# Patient Record
Sex: FEMALE | Race: BLACK OR AFRICAN AMERICAN | Employment: FULL TIME | URBAN - METROPOLITAN AREA
[De-identification: names, ages, dates, MRNs, and addresses within clinical notes are randomized per-mention and may not be internally consistent; named-entity substitution may affect disease eponyms.]

---

## 2018-08-16 ENCOUNTER — HOSPITAL ENCOUNTER (OUTPATIENT)
Dept: PREADMISSION TESTING | Age: 63
Discharge: HOME | End: 2018-08-16
Payer: COMMERCIAL

## 2018-08-16 VITALS
HEIGHT: 63 IN | TEMPERATURE: 97.8 F | WEIGHT: 202 LBS | SYSTOLIC BLOOD PRESSURE: 144 MMHG | DIASTOLIC BLOOD PRESSURE: 78 MMHG | BODY MASS INDEX: 35.79 KG/M2 | HEART RATE: 71 BPM

## 2018-08-16 LAB
ABO + RH BLD: NORMAL
ANION GAP SERPL CALC-SCNC: 8 MMOL/L (ref 5–15)
APPEARANCE UR: CLEAR
BACTERIA URNS QL MICRO: NEGATIVE /HPF
BILIRUB UR QL: NEGATIVE
BLOOD GROUP ANTIBODIES SERPL: NORMAL
BUN SERPL-MCNC: 18 MG/DL (ref 6–20)
BUN/CREAT SERPL: 19 (ref 12–20)
CALCIUM SERPL-MCNC: 9.3 MG/DL (ref 8.5–10.1)
CHLORIDE SERPL-SCNC: 101 MMOL/L (ref 97–108)
CO2 SERPL-SCNC: 32 MMOL/L (ref 21–32)
COLOR UR: NORMAL
CREAT SERPL-MCNC: 0.93 MG/DL (ref 0.55–1.02)
EPITH CASTS URNS QL MICRO: NORMAL /LPF
ERYTHROCYTE [DISTWIDTH] IN BLOOD BY AUTOMATED COUNT: 14.1 % (ref 11.5–14.5)
EST. AVERAGE GLUCOSE BLD GHB EST-MCNC: 117 MG/DL
GLUCOSE SERPL-MCNC: 134 MG/DL (ref 65–100)
GLUCOSE UR STRIP.AUTO-MCNC: NEGATIVE MG/DL
HBA1C MFR BLD: 5.7 % (ref 4.2–6.3)
HCT VFR BLD AUTO: 41.8 % (ref 35–47)
HGB BLD-MCNC: 13.3 G/DL (ref 11.5–16)
HGB UR QL STRIP: NEGATIVE
HYALINE CASTS URNS QL MICRO: NORMAL /LPF (ref 0–5)
INR PPP: 1 (ref 0.9–1.1)
KETONES UR QL STRIP.AUTO: NEGATIVE MG/DL
LEUKOCYTE ESTERASE UR QL STRIP.AUTO: NEGATIVE
MCH RBC QN AUTO: 25.6 PG (ref 26–34)
MCHC RBC AUTO-ENTMCNC: 31.8 G/DL (ref 30–36.5)
MCV RBC AUTO: 80.4 FL (ref 80–99)
NITRITE UR QL STRIP.AUTO: NEGATIVE
NRBC # BLD: 0 K/UL (ref 0–0.01)
NRBC BLD-RTO: 0 PER 100 WBC
PH UR STRIP: 7.5 [PH] (ref 5–8)
PLATELET # BLD AUTO: 242 K/UL (ref 150–400)
PMV BLD AUTO: 12.5 FL (ref 8.9–12.9)
POTASSIUM SERPL-SCNC: 3.2 MMOL/L (ref 3.5–5.1)
PROT UR STRIP-MCNC: NEGATIVE MG/DL
PROTHROMBIN TIME: 10.3 SEC (ref 9–11.1)
RBC # BLD AUTO: 5.2 M/UL (ref 3.8–5.2)
RBC #/AREA URNS HPF: NORMAL /HPF (ref 0–5)
SODIUM SERPL-SCNC: 141 MMOL/L (ref 136–145)
SP GR UR REFRACTOMETRY: 1.02 (ref 1–1.03)
SPECIMEN EXP DATE BLD: NORMAL
UA: UC IF INDICATED,UAUC: NORMAL
UROBILINOGEN UR QL STRIP.AUTO: 0.2 EU/DL (ref 0.2–1)
WBC # BLD AUTO: 8.1 K/UL (ref 3.6–11)
WBC URNS QL MICRO: NORMAL /HPF (ref 0–4)

## 2018-08-16 PROCEDURE — 93005 ELECTROCARDIOGRAM TRACING: CPT

## 2018-08-16 PROCEDURE — 80048 BASIC METABOLIC PNL TOTAL CA: CPT | Performed by: ORTHOPAEDIC SURGERY

## 2018-08-16 PROCEDURE — 86900 BLOOD TYPING SEROLOGIC ABO: CPT | Performed by: ORTHOPAEDIC SURGERY

## 2018-08-16 PROCEDURE — 85610 PROTHROMBIN TIME: CPT | Performed by: ORTHOPAEDIC SURGERY

## 2018-08-16 PROCEDURE — 83036 HEMOGLOBIN GLYCOSYLATED A1C: CPT | Performed by: ORTHOPAEDIC SURGERY

## 2018-08-16 PROCEDURE — 81001 URINALYSIS AUTO W/SCOPE: CPT | Performed by: ORTHOPAEDIC SURGERY

## 2018-08-16 PROCEDURE — 85027 COMPLETE CBC AUTOMATED: CPT | Performed by: ORTHOPAEDIC SURGERY

## 2018-08-16 RX ORDER — CHOLECALCIFEROL (VITAMIN D3) 125 MCG
220 CAPSULE ORAL DAILY
COMMUNITY
End: 2018-08-25

## 2018-08-16 RX ORDER — TRIAMTERENE/HYDROCHLOROTHIAZID 37.5-25 MG
1 TABLET ORAL DAILY
COMMUNITY

## 2018-08-16 RX ORDER — LOSARTAN POTASSIUM 50 MG/1
50 TABLET ORAL DAILY
COMMUNITY

## 2018-08-16 RX ORDER — DICLOFENAC SODIUM 50 MG/1
50 TABLET, DELAYED RELEASE ORAL 2 TIMES DAILY
COMMUNITY
End: 2018-08-25

## 2018-08-16 RX ORDER — GUAIFENESIN 100 MG/5ML
81 LIQUID (ML) ORAL DAILY
COMMUNITY
End: 2018-08-25

## 2018-08-17 PROBLEM — M16.11 PRIMARY OSTEOARTHRITIS OF RIGHT HIP: Status: ACTIVE | Noted: 2018-08-17

## 2018-08-17 LAB
ATRIAL RATE: 70 BPM
BACTERIA SPEC CULT: NORMAL
BACTERIA SPEC CULT: NORMAL
CALCULATED P AXIS, ECG09: 36 DEGREES
CALCULATED R AXIS, ECG10: 1 DEGREES
CALCULATED T AXIS, ECG11: 31 DEGREES
DIAGNOSIS, 93000: NORMAL
P-R INTERVAL, ECG05: 148 MS
Q-T INTERVAL, ECG07: 404 MS
QRS DURATION, ECG06: 80 MS
QTC CALCULATION (BEZET), ECG08: 436 MS
SERVICE CMNT-IMP: NORMAL
VENTRICULAR RATE, ECG03: 70 BPM

## 2018-08-17 NOTE — PERIOP NOTES
FAXED PRE-ADMISSION TESTING REPORTS (AND FAX CONFIRMATION RECEIVED TO DR. FARNSWORTH'S OFFICE. CALLED ON 8/17/18  AT 0955  AND LEFT MESSAGE ON JAN'S  VOICEMAIL, RE: ABNORMAL POTASSIUM 3.  2(L)

## 2018-08-17 NOTE — H&P
Chief Complaint: Pain of the Right Hip     Bozena Torres comes in to the clinic today for follow-up of her right hip. We've seen the patient 2 weeks ago for her moderate arthritis to her knees. At that time, I believed her greater concerns was the right hip as she was found to have advanced degenerative arthritis with complete loss of joint space. At that time, we discussed right total hip replacement and she wished to defer this surgery for a later date. She comes in to the clinic today now to discuss about the surgery. She continues to have severe right hip pain. She is unable to due her daily activities without difficulty. Her pain causes her to wake up at night. The patient feels she failed conservative treatment. No recent injuries or aggravating events.      Of note, patient notes the sensation of right leg shortening for the past several years now.      Review of Systems   7/30/2018             Musculoskeletal: Joint Pain: Positive     Medical History        Past Medical History:   Diagnosis Date    Hypertension              Surgical History   Past Surgical History:   Procedure Laterality Date    NO RELEVANT ORTHOPAEDIC SURGERIES        NO RELEVANT SURGERIES                Objective:          Vitals:     07/30/18 0957   BP: 118/68         Constitutional:  No acute distress. Well nourished. Well developed. Eyes:  Sclera are nonicteric. Respiratory:  No labored breathing. Cardiovascular:  No marked edema. Skin:  No marked skin ulcers. Neurological:  No marked sensory loss noted. Psychiatric: Alert and oriented x3. Musculoskeletal      On exam reveals the patient ambulates with a marked limp favoring the right hip. Marked reduction to rotation with any attempts to rotate are painful. Right leg is approximately 3 mm shorter than the left. No calf pain or DVT. No numbness, tingling or paresthesias.  No signs of infection.      Imaging/Studies:         X-ray Hip Right 2+ Views ([id number])     Result Date: 7/17/2018  AP Pelvis, Frog Lateral.      Notes  Impression: I ordered and interpreted X-rays of the right hip. They   revealed marked degenerative changes in the right hip. Essentially   complete loss of joint space. No fracture. No metastatic disease.        Assessment:   There is no problem list on file for this patient.        1. Primary osteoarthritis of right hip             Plan:   I reviewed my findings with the patient. I reviewed the pathophysiology and explained she has advanced degenerative arthritis to the right hip. We discussed treatment options. The patient has failed conservative treatment. I explained that the only treatment modality that would provide her long term relief of her symptoms would be right total hip replacement. The patient wished to proceed with the proposed plan.      I reviewed the hospitalization, post-op and rehabilitation for total hip replacement. We discussed all complications associated with the surgery, including the chance of infection and DVT. She understands that she would be on antibiotics and Xarelto following surgery to prevent infection and DVT. We discussed the small potential need for blood transfusion. I discussed issues of leg length and instability. I discussed the chance of dislocation following total hip replacement and instructed her on total hip precautions. PROCEDURE: Right total hip replacement. Date of Surgery Update:  Cheryl Pace was seen and examined. Past Medical History:   Diagnosis Date    Anxiety     Arthritis     Hypertension     BORDERLINE     Prior to Admission Medications   Prescriptions Last Dose Informant Patient Reported? Taking?   aspirin 81 mg chewable tablet 8/13/2018  Yes No   Sig: Take 81 mg by mouth daily. diclofenac EC (VOLTAREN) 50 mg EC tablet 8/16/2018 at Unknown time  Yes Yes   Sig: Take 50 mg by mouth two (2) times a day. losartan (COZAAR) 50 mg tablet 8/22/2018 at 8a  Yes Yes   Sig: Take 50 mg by mouth daily. naproxen sodium (ALEVE) 220 mg cap 8/16/2018 at Unknown time  Yes Yes   Sig: Take 220 mg by mouth daily. triamterene-hydroCHLOROthiazide (MAXZIDE) 37.5-25 mg per tablet 8/22/2018 at 8a  Yes Yes   Sig: Take 1 Tab by mouth daily. Facility-Administered Medications: None      Allergy to: Bactrim [sulfamethoprim] and Oxycodone  Physical Examination: General appearance - alert, well appearing, and in no distress  History and physical has been reviewed. The patient has been examined.  There have been no significant clinical changes since the completion of the originally dated History and Physical.    Signed By: Bolivar Pereira PA-C     August 23, 2018 7:33 AM

## 2018-08-22 ENCOUNTER — ANESTHESIA EVENT (OUTPATIENT)
Dept: SURGERY | Age: 63
DRG: 470 | End: 2018-08-22
Payer: COMMERCIAL

## 2018-08-23 ENCOUNTER — ANESTHESIA (OUTPATIENT)
Dept: SURGERY | Age: 63
DRG: 470 | End: 2018-08-23
Payer: COMMERCIAL

## 2018-08-23 ENCOUNTER — APPOINTMENT (OUTPATIENT)
Dept: GENERAL RADIOLOGY | Age: 63
DRG: 470 | End: 2018-08-23
Payer: COMMERCIAL

## 2018-08-23 ENCOUNTER — APPOINTMENT (OUTPATIENT)
Dept: GENERAL RADIOLOGY | Age: 63
DRG: 470 | End: 2018-08-23
Attending: ORTHOPAEDIC SURGERY
Payer: COMMERCIAL

## 2018-08-23 ENCOUNTER — HOSPITAL ENCOUNTER (INPATIENT)
Age: 63
LOS: 2 days | Discharge: HOME HEALTH | DRG: 470 | End: 2018-08-25
Attending: ORTHOPAEDIC SURGERY | Admitting: ORTHOPAEDIC SURGERY
Payer: COMMERCIAL

## 2018-08-23 DIAGNOSIS — M16.11 PRIMARY OSTEOARTHRITIS OF RIGHT HIP: Primary | ICD-10-CM

## 2018-08-23 LAB
GLUCOSE BLD STRIP.AUTO-MCNC: 107 MG/DL (ref 65–100)
SERVICE CMNT-IMP: ABNORMAL

## 2018-08-23 PROCEDURE — 74011250637 HC RX REV CODE- 250/637: Performed by: PHYSICIAN ASSISTANT

## 2018-08-23 PROCEDURE — 77030034850: Performed by: ORTHOPAEDIC SURGERY

## 2018-08-23 PROCEDURE — 77030008467 HC STPLR SKN COVD -B: Performed by: ORTHOPAEDIC SURGERY

## 2018-08-23 PROCEDURE — 77030006822 HC BLD SAW SAG BRSM -B: Performed by: ORTHOPAEDIC SURGERY

## 2018-08-23 PROCEDURE — 77030018846 HC SOL IRR STRL H20 ICUM -A: Performed by: ORTHOPAEDIC SURGERY

## 2018-08-23 PROCEDURE — 76210000006 HC OR PH I REC 0.5 TO 1 HR: Performed by: ORTHOPAEDIC SURGERY

## 2018-08-23 PROCEDURE — 77030032490 HC SLV COMPR SCD KNE COVD -B: Performed by: ORTHOPAEDIC SURGERY

## 2018-08-23 PROCEDURE — 77030020788: Performed by: ORTHOPAEDIC SURGERY

## 2018-08-23 PROCEDURE — 76060000036 HC ANESTHESIA 2.5 TO 3 HR: Performed by: ORTHOPAEDIC SURGERY

## 2018-08-23 PROCEDURE — 77030013079 HC BLNKT BAIR HGGR 3M -A: Performed by: ANESTHESIOLOGY

## 2018-08-23 PROCEDURE — 74011000258 HC RX REV CODE- 258

## 2018-08-23 PROCEDURE — 77030035236 HC SUT PDS STRATFX BARB J&J -B: Performed by: ORTHOPAEDIC SURGERY

## 2018-08-23 PROCEDURE — C1776 JOINT DEVICE (IMPLANTABLE): HCPCS | Performed by: ORTHOPAEDIC SURGERY

## 2018-08-23 PROCEDURE — 74011250636 HC RX REV CODE- 250/636

## 2018-08-23 PROCEDURE — 65270000029 HC RM PRIVATE

## 2018-08-23 PROCEDURE — 74011000250 HC RX REV CODE- 250: Performed by: PHYSICIAN ASSISTANT

## 2018-08-23 PROCEDURE — 77030038020 HC MANFLD NEPTUNE STRY -B: Performed by: ORTHOPAEDIC SURGERY

## 2018-08-23 PROCEDURE — 82962 GLUCOSE BLOOD TEST: CPT

## 2018-08-23 PROCEDURE — 77030018836 HC SOL IRR NACL ICUM -A: Performed by: ORTHOPAEDIC SURGERY

## 2018-08-23 PROCEDURE — 77030007866 HC KT SPN ANES BBMI -B: Performed by: ANESTHESIOLOGY

## 2018-08-23 PROCEDURE — 73501 X-RAY EXAM HIP UNI 1 VIEW: CPT

## 2018-08-23 PROCEDURE — 77030020782 HC GWN BAIR PAWS FLX 3M -B

## 2018-08-23 PROCEDURE — 77030012935 HC DRSG AQUACEL BMS -B: Performed by: ORTHOPAEDIC SURGERY

## 2018-08-23 PROCEDURE — 76010000172 HC OR TIME 2.5 TO 3 HR INTENSV-TIER 1: Performed by: ORTHOPAEDIC SURGERY

## 2018-08-23 PROCEDURE — 74011250636 HC RX REV CODE- 250/636: Performed by: PHYSICIAN ASSISTANT

## 2018-08-23 PROCEDURE — 0SR903A REPLACEMENT OF RIGHT HIP JOINT WITH CERAMIC SYNTHETIC SUBSTITUTE, UNCEMENTED, OPEN APPROACH: ICD-10-PCS | Performed by: ORTHOPAEDIC SURGERY

## 2018-08-23 PROCEDURE — 77030031139 HC SUT VCRL2 J&J -A: Performed by: ORTHOPAEDIC SURGERY

## 2018-08-23 PROCEDURE — 77030011640 HC PAD GRND REM COVD -A: Performed by: ORTHOPAEDIC SURGERY

## 2018-08-23 PROCEDURE — 74011000250 HC RX REV CODE- 250

## 2018-08-23 PROCEDURE — 77030011264 HC ELECTRD BLD EXT COVD -A: Performed by: ORTHOPAEDIC SURGERY

## 2018-08-23 DEVICE — PINNACLE HIP SOLUTIONS ALTRX POLYETHYLENE ACETABULAR LINER NEUTRAL 36MM ID 52MM OD
Type: IMPLANTABLE DEVICE | Site: HIP | Status: FUNCTIONAL
Brand: PINNACLE ALTRX

## 2018-08-23 DEVICE — SUMMIT FEMORAL STEM 12/14 TAPER TAPER ED W/POROCOAT SIZE 6 STD 150MM
Type: IMPLANTABLE DEVICE | Site: HIP | Status: FUNCTIONAL
Brand: SUMMIT POROCOAT

## 2018-08-23 DEVICE — PINNACLE CANCELLOUS BONE SCREW 6.5MM X 20MM
Type: IMPLANTABLE DEVICE | Site: HIP | Status: FUNCTIONAL
Brand: PINNACLE

## 2018-08-23 DEVICE — BIOLOX DELTA CERAMIC FEMORAL HEAD +1.5 36MM DIA 12/14 TAPER
Type: IMPLANTABLE DEVICE | Site: HIP | Status: FUNCTIONAL
Brand: BIOLOX DELTA

## 2018-08-23 DEVICE — PINNACLE GRIPTION ACETABULAR SHELL SECTOR 52MM OD
Type: IMPLANTABLE DEVICE | Site: HIP | Status: FUNCTIONAL
Brand: PINNACLE GRIPTION

## 2018-08-23 DEVICE — COMPONENT TOT HIP PRIMARY CERM ALTRX: Type: IMPLANTABLE DEVICE | Status: FUNCTIONAL

## 2018-08-23 RX ORDER — HYDROXYZINE HYDROCHLORIDE 10 MG/1
10 TABLET, FILM COATED ORAL
Status: DISCONTINUED | OUTPATIENT
Start: 2018-08-23 | End: 2018-08-25 | Stop reason: HOSPADM

## 2018-08-23 RX ORDER — AMOXICILLIN 250 MG
1 CAPSULE ORAL 2 TIMES DAILY
Status: DISCONTINUED | OUTPATIENT
Start: 2018-08-23 | End: 2018-08-25 | Stop reason: HOSPADM

## 2018-08-23 RX ORDER — FENTANYL CITRATE 50 UG/ML
25 INJECTION, SOLUTION INTRAMUSCULAR; INTRAVENOUS
Status: DISCONTINUED | OUTPATIENT
Start: 2018-08-23 | End: 2018-08-23 | Stop reason: HOSPADM

## 2018-08-23 RX ORDER — ONDANSETRON 2 MG/ML
4 INJECTION INTRAMUSCULAR; INTRAVENOUS
Status: ACTIVE | OUTPATIENT
Start: 2018-08-23 | End: 2018-08-24

## 2018-08-23 RX ORDER — PROPOFOL 10 MG/ML
INJECTION, EMULSION INTRAVENOUS
Status: DISCONTINUED | OUTPATIENT
Start: 2018-08-23 | End: 2018-08-23 | Stop reason: HOSPADM

## 2018-08-23 RX ORDER — PROPOFOL 10 MG/ML
INJECTION, EMULSION INTRAVENOUS AS NEEDED
Status: DISCONTINUED | OUTPATIENT
Start: 2018-08-23 | End: 2018-08-23 | Stop reason: HOSPADM

## 2018-08-23 RX ORDER — ROPIVACAINE HYDROCHLORIDE 5 MG/ML
150 INJECTION, SOLUTION EPIDURAL; INFILTRATION; PERINEURAL AS NEEDED
Status: DISCONTINUED | OUTPATIENT
Start: 2018-08-23 | End: 2018-08-23 | Stop reason: HOSPADM

## 2018-08-23 RX ORDER — PHENYLEPHRINE HCL IN 0.9% NACL 0.4MG/10ML
SYRINGE (ML) INTRAVENOUS AS NEEDED
Status: DISCONTINUED | OUTPATIENT
Start: 2018-08-23 | End: 2018-08-23 | Stop reason: HOSPADM

## 2018-08-23 RX ORDER — SODIUM CHLORIDE, SODIUM LACTATE, POTASSIUM CHLORIDE, CALCIUM CHLORIDE 600; 310; 30; 20 MG/100ML; MG/100ML; MG/100ML; MG/100ML
100 INJECTION, SOLUTION INTRAVENOUS CONTINUOUS
Status: DISCONTINUED | OUTPATIENT
Start: 2018-08-23 | End: 2018-08-23 | Stop reason: HOSPADM

## 2018-08-23 RX ORDER — FENTANYL CITRATE 50 UG/ML
50 INJECTION, SOLUTION INTRAMUSCULAR; INTRAVENOUS AS NEEDED
Status: DISCONTINUED | OUTPATIENT
Start: 2018-08-23 | End: 2018-08-23 | Stop reason: HOSPADM

## 2018-08-23 RX ORDER — SODIUM CHLORIDE, SODIUM LACTATE, POTASSIUM CHLORIDE, CALCIUM CHLORIDE 600; 310; 30; 20 MG/100ML; MG/100ML; MG/100ML; MG/100ML
INJECTION, SOLUTION INTRAVENOUS
Status: DISCONTINUED | OUTPATIENT
Start: 2018-08-23 | End: 2018-08-23 | Stop reason: HOSPADM

## 2018-08-23 RX ORDER — POLYETHYLENE GLYCOL 3350 17 G/17G
17 POWDER, FOR SOLUTION ORAL DAILY
Status: DISCONTINUED | OUTPATIENT
Start: 2018-08-23 | End: 2018-08-25 | Stop reason: HOSPADM

## 2018-08-23 RX ORDER — SODIUM CHLORIDE 0.9 % (FLUSH) 0.9 %
5-10 SYRINGE (ML) INJECTION EVERY 8 HOURS
Status: DISCONTINUED | OUTPATIENT
Start: 2018-08-23 | End: 2018-08-23 | Stop reason: HOSPADM

## 2018-08-23 RX ORDER — NALOXONE HYDROCHLORIDE 0.4 MG/ML
0.4 INJECTION, SOLUTION INTRAMUSCULAR; INTRAVENOUS; SUBCUTANEOUS AS NEEDED
Status: DISCONTINUED | OUTPATIENT
Start: 2018-08-23 | End: 2018-08-25 | Stop reason: HOSPADM

## 2018-08-23 RX ORDER — SODIUM CHLORIDE 9 MG/ML
125 INJECTION, SOLUTION INTRAVENOUS CONTINUOUS
Status: DISPENSED | OUTPATIENT
Start: 2018-08-23 | End: 2018-08-24

## 2018-08-23 RX ORDER — MIDAZOLAM HYDROCHLORIDE 1 MG/ML
1 INJECTION, SOLUTION INTRAMUSCULAR; INTRAVENOUS AS NEEDED
Status: DISCONTINUED | OUTPATIENT
Start: 2018-08-23 | End: 2018-08-23 | Stop reason: HOSPADM

## 2018-08-23 RX ORDER — ACETAMINOPHEN 325 MG/1
650 TABLET ORAL
Status: DISCONTINUED | OUTPATIENT
Start: 2018-08-23 | End: 2018-08-25 | Stop reason: HOSPADM

## 2018-08-23 RX ORDER — MIDAZOLAM HYDROCHLORIDE 1 MG/ML
INJECTION, SOLUTION INTRAMUSCULAR; INTRAVENOUS AS NEEDED
Status: DISCONTINUED | OUTPATIENT
Start: 2018-08-23 | End: 2018-08-23 | Stop reason: HOSPADM

## 2018-08-23 RX ORDER — OXYCODONE HYDROCHLORIDE 5 MG/1
10 TABLET ORAL
Status: DISCONTINUED | OUTPATIENT
Start: 2018-08-23 | End: 2018-08-24

## 2018-08-23 RX ORDER — MIDAZOLAM HYDROCHLORIDE 1 MG/ML
0.5 INJECTION, SOLUTION INTRAMUSCULAR; INTRAVENOUS
Status: DISCONTINUED | OUTPATIENT
Start: 2018-08-23 | End: 2018-08-23 | Stop reason: HOSPADM

## 2018-08-23 RX ORDER — CELECOXIB 200 MG/1
200 CAPSULE ORAL ONCE
Status: COMPLETED | OUTPATIENT
Start: 2018-08-23 | End: 2018-08-23

## 2018-08-23 RX ORDER — SODIUM CHLORIDE 0.9 % (FLUSH) 0.9 %
5-10 SYRINGE (ML) INJECTION EVERY 8 HOURS
Status: DISCONTINUED | OUTPATIENT
Start: 2018-08-24 | End: 2018-08-25 | Stop reason: HOSPADM

## 2018-08-23 RX ORDER — FAMOTIDINE 20 MG/1
20 TABLET, FILM COATED ORAL
Status: DISCONTINUED | OUTPATIENT
Start: 2018-08-23 | End: 2018-08-24

## 2018-08-23 RX ORDER — TRIAMTERENE/HYDROCHLOROTHIAZID 37.5-25 MG
1 TABLET ORAL DAILY
Status: DISCONTINUED | OUTPATIENT
Start: 2018-08-24 | End: 2018-08-24

## 2018-08-23 RX ORDER — LIDOCAINE HYDROCHLORIDE 10 MG/ML
0.1 INJECTION, SOLUTION EPIDURAL; INFILTRATION; INTRACAUDAL; PERINEURAL AS NEEDED
Status: DISCONTINUED | OUTPATIENT
Start: 2018-08-23 | End: 2018-08-23 | Stop reason: HOSPADM

## 2018-08-23 RX ORDER — FENTANYL CITRATE 50 UG/ML
25 INJECTION, SOLUTION INTRAMUSCULAR; INTRAVENOUS
Status: DISCONTINUED | OUTPATIENT
Start: 2018-08-23 | End: 2018-08-24

## 2018-08-23 RX ORDER — BUPIVACAINE HYDROCHLORIDE 5 MG/ML
INJECTION, SOLUTION EPIDURAL; INTRACAUDAL AS NEEDED
Status: DISCONTINUED | OUTPATIENT
Start: 2018-08-23 | End: 2018-08-23 | Stop reason: HOSPADM

## 2018-08-23 RX ORDER — FACIAL-BODY WIPES
10 EACH TOPICAL DAILY PRN
Status: DISCONTINUED | OUTPATIENT
Start: 2018-08-25 | End: 2018-08-25 | Stop reason: HOSPADM

## 2018-08-23 RX ORDER — FENTANYL CITRATE 50 UG/ML
INJECTION, SOLUTION INTRAMUSCULAR; INTRAVENOUS AS NEEDED
Status: DISCONTINUED | OUTPATIENT
Start: 2018-08-23 | End: 2018-08-23 | Stop reason: HOSPADM

## 2018-08-23 RX ORDER — ONDANSETRON 2 MG/ML
INJECTION INTRAMUSCULAR; INTRAVENOUS AS NEEDED
Status: DISCONTINUED | OUTPATIENT
Start: 2018-08-23 | End: 2018-08-23 | Stop reason: HOSPADM

## 2018-08-23 RX ORDER — CELECOXIB 200 MG/1
200 CAPSULE ORAL EVERY 12 HOURS
Status: DISCONTINUED | OUTPATIENT
Start: 2018-08-23 | End: 2018-08-24

## 2018-08-23 RX ORDER — ACETAMINOPHEN 325 MG/1
650 TABLET ORAL EVERY 6 HOURS
Status: DISCONTINUED | OUTPATIENT
Start: 2018-08-23 | End: 2018-08-25 | Stop reason: HOSPADM

## 2018-08-23 RX ORDER — SODIUM CHLORIDE 0.9 % (FLUSH) 0.9 %
5-10 SYRINGE (ML) INJECTION AS NEEDED
Status: DISCONTINUED | OUTPATIENT
Start: 2018-08-23 | End: 2018-08-25 | Stop reason: HOSPADM

## 2018-08-23 RX ORDER — SODIUM CHLORIDE 0.9 % (FLUSH) 0.9 %
5-10 SYRINGE (ML) INJECTION AS NEEDED
Status: DISCONTINUED | OUTPATIENT
Start: 2018-08-23 | End: 2018-08-23 | Stop reason: HOSPADM

## 2018-08-23 RX ORDER — OXYCODONE HYDROCHLORIDE 5 MG/1
5 TABLET ORAL
Status: DISCONTINUED | OUTPATIENT
Start: 2018-08-23 | End: 2018-08-24

## 2018-08-23 RX ORDER — CEFAZOLIN SODIUM/WATER 2 G/20 ML
2 SYRINGE (ML) INTRAVENOUS EVERY 8 HOURS
Status: DISCONTINUED | OUTPATIENT
Start: 2018-08-23 | End: 2018-08-23 | Stop reason: HOSPADM

## 2018-08-23 RX ORDER — LOSARTAN POTASSIUM 50 MG/1
50 TABLET ORAL DAILY
Status: DISCONTINUED | OUTPATIENT
Start: 2018-08-24 | End: 2018-08-24

## 2018-08-23 RX ORDER — CEFAZOLIN SODIUM/WATER 2 G/20 ML
2 SYRINGE (ML) INTRAVENOUS EVERY 8 HOURS
Status: COMPLETED | OUTPATIENT
Start: 2018-08-23 | End: 2018-08-23

## 2018-08-23 RX ORDER — DIPHENHYDRAMINE HYDROCHLORIDE 50 MG/ML
12.5 INJECTION, SOLUTION INTRAMUSCULAR; INTRAVENOUS AS NEEDED
Status: DISCONTINUED | OUTPATIENT
Start: 2018-08-23 | End: 2018-08-23 | Stop reason: HOSPADM

## 2018-08-23 RX ADMIN — FENTANYL CITRATE 25 MCG: 50 INJECTION, SOLUTION INTRAMUSCULAR; INTRAVENOUS at 07:33

## 2018-08-23 RX ADMIN — ACETAMINOPHEN 650 MG: 325 TABLET ORAL at 18:33

## 2018-08-23 RX ADMIN — Medication 80 MCG: at 07:45

## 2018-08-23 RX ADMIN — SODIUM CHLORIDE, SODIUM LACTATE, POTASSIUM CHLORIDE, CALCIUM CHLORIDE: 600; 310; 30; 20 INJECTION, SOLUTION INTRAVENOUS at 07:58

## 2018-08-23 RX ADMIN — CELECOXIB 200 MG: 200 CAPSULE ORAL at 06:27

## 2018-08-23 RX ADMIN — Medication 2 G: at 07:43

## 2018-08-23 RX ADMIN — ACETAMINOPHEN 650 MG: 325 TABLET ORAL at 12:46

## 2018-08-23 RX ADMIN — PROPOFOL 50 MCG/KG/MIN: 10 INJECTION, EMULSION INTRAVENOUS at 07:42

## 2018-08-23 RX ADMIN — BUPIVACAINE HYDROCHLORIDE 11 MG: 5 INJECTION, SOLUTION EPIDURAL; INTRACAUDAL at 07:40

## 2018-08-23 RX ADMIN — SODIUM CHLORIDE, SODIUM LACTATE, POTASSIUM CHLORIDE, CALCIUM CHLORIDE: 600; 310; 30; 20 INJECTION, SOLUTION INTRAVENOUS at 08:40

## 2018-08-23 RX ADMIN — ACETAMINOPHEN 650 MG: 325 TABLET ORAL at 23:48

## 2018-08-23 RX ADMIN — RIVAROXABAN 10 MG: 10 TABLET, FILM COATED ORAL at 19:19

## 2018-08-23 RX ADMIN — DOCUSATE SODIUM AND SENNOSIDES 1 TABLET: 8.6; 5 TABLET, FILM COATED ORAL at 12:46

## 2018-08-23 RX ADMIN — Medication 80 MCG: at 07:42

## 2018-08-23 RX ADMIN — Medication 2 G: at 23:48

## 2018-08-23 RX ADMIN — SODIUM CHLORIDE, SODIUM LACTATE, POTASSIUM CHLORIDE, CALCIUM CHLORIDE: 600; 310; 30; 20 INJECTION, SOLUTION INTRAVENOUS at 07:24

## 2018-08-23 RX ADMIN — PROPOFOL 20 MG: 10 INJECTION, EMULSION INTRAVENOUS at 07:33

## 2018-08-23 RX ADMIN — DOCUSATE SODIUM AND SENNOSIDES 1 TABLET: 8.6; 5 TABLET, FILM COATED ORAL at 18:30

## 2018-08-23 RX ADMIN — Medication 2 G: at 17:06

## 2018-08-23 RX ADMIN — CELECOXIB 200 MG: 200 CAPSULE ORAL at 18:30

## 2018-08-23 RX ADMIN — ONDANSETRON 4 MG: 2 INJECTION INTRAMUSCULAR; INTRAVENOUS at 10:16

## 2018-08-23 RX ADMIN — OXYCODONE HYDROCHLORIDE 5 MG: 5 TABLET ORAL at 13:07

## 2018-08-23 RX ADMIN — MIDAZOLAM HYDROCHLORIDE 2 MG: 1 INJECTION, SOLUTION INTRAMUSCULAR; INTRAVENOUS at 07:33

## 2018-08-23 RX ADMIN — PROPOFOL 20 MG: 10 INJECTION, EMULSION INTRAVENOUS at 07:35

## 2018-08-23 RX ADMIN — OXYCODONE HYDROCHLORIDE 5 MG: 5 TABLET ORAL at 18:30

## 2018-08-23 RX ADMIN — SODIUM CHLORIDE 125 ML/HR: 900 INJECTION, SOLUTION INTRAVENOUS at 10:45

## 2018-08-23 RX ADMIN — POLYETHYLENE GLYCOL 3350 17 G: 17 POWDER, FOR SOLUTION ORAL at 12:46

## 2018-08-23 NOTE — BRIEF OP NOTE
BRIEF OPERATIVE NOTE    Date of Procedure: 8/23/2018   Preoperative Diagnosis: DJD RIGHT HIP   Postoperative Diagnosis: DJD RIGHT HIP     Procedure(s):  RIGHT TOTAL HIP REPLACEMENT   Surgeon(s) and Role:     * Shaista Kim MD - Primary         Surgical Assistant: Mira Bhakta PA-C    Surgical Staff:  Circ-1: Stephen Ham RN  Circ-Relief: Isaías Barajas  Circ-Intern: Lara Cosby RN  Physician Assistant: Catie Sorenson PA-C  Radiology Technician: Constanza Arevalo RT, R  Scrub Tech-1: [address] [facility]  Surg Asst-1: Diantha Half  Event Time In   Incision Start 2931   Incision Close 1001     Anesthesia: Spinal   Estimated Blood Loss: 200 mL  Specimens: * No specimens in log *   Findings: DJD Right hip   Complications: None. Implants:   Implant Name Type Inv.  Item Serial No.  Lot No. LRB No. Used Action   CUP ACET SECTOR GRIPTION 52MM -- TI - SNA  CUP ACET SECTOR GRIPTION 52MM -- TI NA [facility] [employer] [id number] Right 1 Implanted   SCR ACET CANC PINN 6.5X20MM SS --  - SNA  SCR ACET CANC PINN 6.5X20MM SS --  NA [facility] [id number] Right 1 Implanted   LINER ACET PINN NEUT [id number] -- ALTRX - SNA  LINER ACET PINN NEUT [id number] -- ALTRX NA [facility] [id number] Right 1 Implanted   STEM FEM 12/14 TAPR 6 -- [place] - SNA  STEM FEM 12/14 TAPR 6 -- [place] NA [facility] [employer] [id number] Right 1 Implanted   HEAD FEM 36MM +1.5MM NK -- [employer] DELTA - SNA   HEAD FEM 36MM +1.5MM NK -- BIOLOX DELTA NA [facility] [employer] [id number] Right 1 Implanted

## 2018-08-23 NOTE — PROGRESS NOTES
Problem: Mobility Impaired (Adult and Pediatric)  Goal: *Acute Goals and Plan of Care (Insert Text)  Physical Therapy Goals  Initiated 8/23/2018    1. Patient will move from supine to sit and sit to supine , scoot up and down and roll side to side in bed with modified independence within 4 days. 2. Patient will perform sit to stand with modified independence within 4 days. 3. Patient will ambulate with modified independence for 150 feet with the least restrictive device within 4 days. 4. Patient will ascend/descend 8 stairs with 2 handrail(s) with modified independence within 4 days. 5. Patient will perform THR home exercise program per protocol with modified independence within 4 days. physical Therapy EVALUATION  Patient: Lon Cespedes (61 y.o. female)  Date: 8/23/2018  Primary Diagnosis: DJD RIGHT HIP   Primary osteoarthritis of right hip  Procedure(s) (LRB):  RIGHT TOTAL HIP REPLACEMENT  (Right) Day of Surgery   Precautions:   WBAT (50%)    ASSESSMENT :  Based on the objective data described below, the patient presents with impaired standing balance without AD and decreased independence with mobility following R THR POD 0. PTA patient was independent with mobility. Patient is overall min A for bed mobility and transfers with RW. Patient ambulated 35 feet with RW and min A/CGA. Cues for sequencing especially around turning. Patient's VSS throughout session with position changes. Patient returned to supine with min A. Patient anticipates discharge Saturday. Recommend HHPT. Patient will benefit from skilled intervention to address the above impairments.   Patients rehabilitation potential is considered to be Good  Factors which may influence rehabilitation potential include:   [x]         None noted  []         Mental ability/status  []         Medical condition  []         Home/family situation and support systems  []         Safety awareness  []         Pain tolerance/management  []         Other: PLAN :  Recommendations and Planned Interventions:  [x]           Bed Mobility Training             [x]    Neuromuscular Re-Education  [x]           Transfer Training                   []    Orthotic/Prosthetic Training  [x]           Gait Training                         []    Modalities  [x]           Therapeutic Exercises           []    Edema Management/Control  [x]           Therapeutic Activities            [x]    Patient and Family Training/Education  []           Other (comment):    Frequency/Duration: Patient will be followed by physical therapy  twice daily to address goals. Discharge Recommendations: Home Health  Further Equipment Recommendations for Discharge: RW and SPC     SUBJECTIVE:   Patient stated I feel good.     OBJECTIVE DATA SUMMARY:   HISTORY:    Past Medical History:   Diagnosis Date    Anxiety     Arthritis     Hypertension     BORDERLINE     Past Surgical History:   Procedure Laterality Date    HX COLONOSCOPY       Prior Level of Function/Home Situation:  independent  Personal factors and/or comorbidities impacting plan of care:     Home Situation  Home Environment: Private residence  # Steps to Enter: 8  Rails to Enter: Yes  Hand Rails : Bilateral  One/Two Story Residence: Two story, live on 1st floor  Living Alone: No  Support Systems: Family member(s)  Patient Expects to be Discharged to[de-identified] Private residence  Current DME Used/Available at Home: [employer] Keb[employer] Walker    EXAMINATION/PRESENTATION/DECISION MAKING:   Critical Behavior:  Neurologic State: Alert           Hearing:   Auditory  Auditory Impairment: None  Skin:    Edema:   Range Of Motion:  AROM: Generally decreased, functional           PROM: Generally decreased, functional           Strength:    Strength: Generally decreased, functional                    Tone & Sensation:                                  Coordination:     Vision:      Functional Mobility:  Bed Mobility:     Supine to Sit: Minimum assistance  Sit to Supine: Minimum assistance     Transfers:  Sit to Stand: Minimum assistance  Stand to Sit: Minimum assistance  Stand Pivot Transfers: Minimum assistance                    Balance:   Sitting: Intact  Standing: Intact; With support  Ambulation/Gait Training:  Distance (ft): 35 Feet (ft)  Assistive Device: Gait belt;Walker, rolling  Ambulation - Level of Assistance: Contact guard assistance        Gait Abnormalities: Antalgic;Decreased step clearance  Right Side Weight Bearing: As tolerated     Base of Support: Widened  Stance: Right decreased  Speed/Amy: Pace decreased (<100 feet/min)  Step Length: Right shortened;Left shortened                     Stairs: Therapeutic Exercises: Ankle pumps, quad sets, heel slides (AAROM)     Functional Measure:  Timed up and go:    Timed Get Up And Go Test: 18     Timed Up and Go and G-code impairment scale:  Percentage of Impairment CH    0%   CI    1-19% CJ    20-39% CK    40-59% CL    60-79% CM    80-99% CN     100%   Timed   Score 0-56 10 11-12 13-14 15-16 17-18 19 20       < than 10 seconds=Normal  Greater then 13.5 seconds (in elderly)=Increased fall risk   Derek Albarado [place] M. Predicting the probability for falls in community dwelling older adults using the Timed Up and Go Test. Phys Ther. 2000;80:896-903. Pain:  Pain Scale 1: Numeric (0 - 10)  Pain Intensity 1: 0              Activity Tolerance:   VSS  Please refer to the flowsheet for vital signs taken during this treatment. After treatment:   []         Patient left in no apparent distress sitting up in chair  [x]         Patient left in no apparent distress in bed  [x]         Call bell left within reach  [x]         Nursing notified  []         Caregiver present  []         Bed alarm activated    COMMUNICATION/EDUCATION:   The patients plan of care was discussed with: Registered Nurse.   [x]         Fall prevention education was provided and the patient/caregiver indicated understanding. [x]         Patient/family have participated as able in goal setting and plan of care. [x]         Patient/family agree to work toward stated goals and plan of care. []         Patient understands intent and goals of therapy, but is neutral about his/her participation. []         Patient is unable to participate in goal setting and plan of care.     Thank you for this referral.  Adilson Mcgowan, PT

## 2018-08-23 NOTE — IP AVS SNAPSHOT
[address] 
[phone] Patient: Eros Epperson MRN: [id number] RXE:3[birth year]04[id number] About your hospitalization You were admitted on:  August 23, 2018 You last received care in the:  [address] [facility] You were discharged on:  August 25, 2018 Why you were hospitalized Your primary diagnosis was:  Primary Osteoarthritis Of Right Hip Follow-up Information Follow up With Details Comments Contact Info Syd Early, [address] [address] 
[phone] Lisbet  For home health physical therapy. [address] 
Thressa Felty [address] 
[phone] Discharge Orders None A check lyudmila indicates which time of day the medication should be taken. My Medications START taking these medications Instructions Each Dose to Equal  
 Morning Noon Evening Bedtime  
 celecoxib 200 mg capsule Commonly known as:  CeleBREX Your last dose was: Your next dose is: Take 1 Cap by mouth daily for 30 days. 200 mg  
    
   
   
   
  
 oxyCODONE IR 5 mg immediate release tablet Commonly known as:  Waldemar Wolf Your last dose was: Your next dose is: Take 1 Tab by mouth every four (4) hours as needed for Pain. Max Daily Amount: 30 mg.  
 5 mg  
    
   
   
   
  
 rivaroxaban 10 mg tablet Commonly known as:  Aby Khan Your last dose was: Your next dose is: Take 1 Tab by mouth daily (with lunch) for 35 days. Indications: hip surgery deep vein thrombosis prevention 10 mg CONTINUE taking these medications Instructions Each Dose to Equal  
 Morning Noon Evening Bedtime  
 losartan 50 mg tablet Commonly known as:  COZAAR Your last dose was: Your next dose is: Take 50 mg by mouth daily.   
 50 mg  
    
   
   
   
  
 triamterene-hydroCHLOROthiazide 37.5-25 mg per tablet Commonly known as:  Jocypapito Davalos Your last dose was: Your next dose is: Take 1 Tab by mouth daily. 1 Tab STOP taking these medications ALEVE 220 mg Cap Generic drug:  naproxen sodium  
   
  
 aspirin 81 mg chewable tablet  
   
  
 diclofenac EC 50 mg EC tablet Commonly known as:  VOLTAREN Where to Get Your Medications Information on where to get these meds will be given to you by the nurse or doctor. ! Ask your nurse or doctor about these medications  
  celecoxib 200 mg capsule  
 oxyCODONE IR 5 mg immediate release tablet  
 rivaroxaban 10 mg tablet Opioid Education Prescription Opioids: What You Need to Know: 
 
Prescription opioids can be used to help relieve moderate-to-severe pain and are often prescribed following a surgery or injury, or for certain health conditions. These medications can be an important part of treatment but also come with serious risks. Opioids are strong pain medicines. Examples include hydrocodone, oxycodone, fentanyl, and morphine. Heroin is an example of an illegal opioid. It is important to work with your health care provider to make sure you are getting the safest, most effective care. WHAT ARE THE RISKS AND SIDE EFFECTS OF OPIOID USE? Prescription opioids carry serious risks of addiction and overdose, especially with prolonged use. An opioid overdose, often marked by slow breathing, can cause sudden death. The use of prescription opioids can have a number of side effects as well, even when taken as directed. · Tolerance-meaning you might need to take more of a medication for the same pain relief · Physical dependence-meaning you have symptoms of withdrawal when the medication is stopped. Withdrawal symptoms can include nausea, sweating, chills, diarrhea, stomach cramps, and muscle aches.   Withdrawal can last up to several weeks, depending on which drug you took and how long you took it. · Increased sensitivity to pain · Constipation · Nausea, vomiting, and dry mouth · Sleepiness and dizziness · Confusion · Depression · Low levels of testosterone that can result in lower sex drive, energy, and strength · Itching and sweating RISKS ARE GREATER WITH:      
· History of drug misuse, substance use disorder, or overdose · Mental health conditions (such as depression or anxiety) · Sleep apnea · Older age (72 years or older) · Pregnancy Avoid alcohol while taking prescription opioids. Also, unless specifically advised by your health care provider, medications to avoid include: · Benzodiazepines (such as Xanax or Valium) · Muscle relaxants (such as Soma or Flexeril) · Hypnotics (such as Ambien or Lunesta) · Other prescription opioids KNOW YOUR OPTIONS Talk to your health care provider about ways to manage your pain that don't involve prescription opioids. Some of these options may actually work better and have fewer risks and side effects. Options may include: 
· Pain relievers such as acetaminophen, ibuprofen, and naproxen · Some medications that are also used for depression or seizures · Physical therapy and exercise · Counseling to help patients learn how to cope better with triggers of pain and stress. · Application of heat or cold compress · Massage therapy · Relaxation techniques Be Informed Make sure you know the name of your medication, how much and how often to take it, and its potential risks & side effects. IF YOU ARE PRESCRIBED OPIOIDS FOR PAIN: 
· Never take opioids in greater amounts or more often than prescribed. Remember the goal is not to be pain-free but to manage your pain at a tolerable level. · Follow up with your primary care provider to: · Work together to create a plan on how to manage your pain. · Talk about ways to help manage your pain that don't involve prescription opioids. · Talk about any and all concerns and side effects. · Help prevent misuse and abuse. · Never sell or share prescription opioids · Help prevent misuse and abuse. · Store prescription opioids in a secure place and out of reach of others (this may include visitors, children, friends, and family). · Safely dispose of unused/unwanted prescription opioids: Find your community drug take-back program or your pharmacy mail-back program, or flush them down the toilet, following guidance from the Food and Drug Administration (www.fda.gov/Drugs/ResourcesForYou). · Visit www.cdc.gov/drugoverdose to learn about the risks of opioid abuse and overdose. · If you believe you may be struggling with addiction, tell your health care provider and ask for guidance or call Support Your App at 4-426-036-HELP. Discharge Instructions DC Orders All Total Hips Case Management for DC planning to Home HC . - PT 2 times a week for 2 weeks; 50% WBAT with AVOID sudden and extreme movement of your hip (surgical leg). - Xarelto therapy once daily for 35 days post-operatively - Remove staples at 2 weeks post-op; 9/6/18 . - Follow up in Office in 2 weeks. After Hospital Care Plan:  Discharge Instructions Hip Replacement-Dr. Fili Mackay Patient Name: Rancho Knowles Date of procedure: 8/23/2018 Procedure: Procedure(s): RIGHT TOTAL HIP REPLACEMENT Surgeon: Vaughn Wild) and Role: 
   * Yaima Jensen MD - Primary PCP: Hudson Oneal MD 
Date of discharge: No discharge date for patient encounter. Follow up appointments ? Follow up with Dr. Fili Mackay in 2 weeks. Call [phone] to make an appointment. ? If home health has been arranged for you the agency will contact you to arrange dates/times for visits.   Please call them if you do not hear from them within 24 hours after you are discharged When to call your Orthopaedic Surgeon: Call [phone]. If you call after 5pm or on a weekend, the on call physician will be contacted ? Increased hip pain, unrelieved pain or if you have difficulty or are unable to walk ? Signs of infection-if your incision is red; continues to have drainage; drainage has a foul odor or if you have a persistent fever over 101 degrees ? Signs of a blood clot in your leg-calf pain, tenderness, redness, swelling of lower leg When to call your Primary Care Physician: 
? Concerns about medical conditions such as diabetes, high blood pressure, asthma, congestive heart failure ? Call if blood sugars are elevated, persistent headache or dizziness, coughing or congestion, constipation or diarrhea, burning with urination, abnormal heart rate When to call 632lnd go to the nearest emergency room 
? acute onset of chest pain, shortness of breath, difficulty breathing Activity ? 50% weight bearing with walker or crutches for 2 weeks, then as tolerated. Refer to pages 23-33 of your handbook for instructions and pictures ? Complete you Home Exercise Program daily as instructed by your therapist. Refer to pages 36-42 of your handbook for instructions and pictures ? Get up every one hour and walk (except at night when sleeping) ? AVOID sudden and extreme movement of your hip (surgical leg) ? Do not drive or operate heavy machinery Incision Care ? The Aquacel (brown, waterproof) surgical dressing is to remain on your hip for 7 days. On the 7th day have someone gently peel the dressing off by carefully lifting the edge and stretching it slightly to break the adhesive seal 
? You will have staples in your hip incision. They will be removed by the home health agency staff ? If your Aquacel dressing comes loose/off before the 7th day, you may replace it with a dry sterile gauze dressing; change it daily.   Once your incision is not draining, you may leave it open to air ? You may take a shower with the Aquacel dressing in place. Once the Aquacel is removed, you may shower and get your incision wet but do not submerge your incision under water in a bath tub, hot tub or swimming pool for 6 weeks after surgery. Preventing blood clots ? Take Xarelto 10 mg once daily for 35 days post-operatively ? Wear elastic stockings (TEDS) for 4 weeks. You should remove them for approximately 1 hour daily for showering/sponge bathing Pain management ? Take pain medication as prescribed; decrease the amount you use as your pain lessens ? Avoid alcoholic beverages while taking pain medication ? Please be aware that many medications contain Tylenol. We do not want you to over medicate so please read the information below as a guide. Do not take more than 4 Grams of Tylenol in a 24 hour period. (There are 1000 milligrams in one Gram) o Percocet contains 325 mg of Tylenol per tablet (do not take more than 12 tablets in 24 hours) 
o Lortab contains 500 mg of Tylenol per tablet (do not take more than 8 tablets in 24 hours) o Norco contains 325 mg of Tylenol per tablet (do not take more than 12 tablets in 24 hours). ? You may place an ice bag on your hip for 15-20 minutes after exercising and as needed throughout the day and night Diet ? Resume usual diet; drink plenty of fluids; eat foods high in fiber ? You may want to take a stool softener (such as Senokot-S or Colace) to prevent constipation while you are taking pain medication. If constipation occurs, take a laxative (such as Dulcolax tablets, Milk of Magnesia, or a suppository) [facility] Health Care [facility] Nursing-see physicians order ? Remove staples per physicians order ? Complete head to toe assessment, vital signs ? Medication reconciliation ? Review pain management ? Manage chronic medical conditions Physical Therapy-see physician's order Weight bearing status: 
Precautions at Admission: WBAT (50%) Right Side Weight Bearing: As tolerated ? Mobility Status: 
Supine to Sit: Minimum assistance Sit to Stand: Minimum assistance Sit to Supine: Minimum assistance Gait: 
Distance (ft): 35 Feet (ft) Ambulation - Level of Assistance: Contact guard assistance Assistive Device: Gait belt, Walker, rolling Gait Abnormalities: Antalgic, Decreased step clearance ADL status overall composite: 
  
  
  
  
  
Physical Therapy ? Assessment and evaluation-bed mobility; functional transfers (bed, chair, bathroom, stairs); ambulation with equipment, car transfers, safety and ability to get out of house in the event of an emergency ? 50% weight bearing x 2 weeks then weight bearing as tolerated ? AVOID sudden and extreme movement of the hip (surgical leg) ? Discuss pain management ? Review how to do ADLs. Refer to pages 43-47 of handbook Home Exercise Program-refer to pages 36-42 of handbook Introducing [facility]! [employer] introduces Skyrider patient portal. Now you can access parts of your medical record, email your doctor's office, and request medication refills online. 1. In your internet browser, go to [email or web address] 2. Click on the First Time User? Click Here link in the Sign In box. You will see the New Member Sign Up page. 3. Enter your Skyrider Access Code exactly as it appears below. You will not need to use this code after youve completed the sign-up process. If you do not sign up before the expiration date, you must request a new code. · Skyrider Access Code: [email or web address] Expires: 11/14/2018 11:33 AM 
 
4. Enter the last four digits of your Social Security Number (xxxx) and Date of Birth (mm/dd/yyyy) as indicated and click Submit. You will be taken to the next sign-up page. 5. Create a Crave.com ID. This will be your Crave.com login ID and cannot be changed, so think of one that is secure and easy to remember. 6. Create a Crave.com password. You can change your password at any time. 7. Enter your Password Reset Question and Answer. This can be used at a later time if you forget your password. 8. Enter your e-mail address. You will receive e-mail notification when new information is available in [facility] 9. Click Sign Up. You can now view and download portions of your medical record. 10. Click the Download Summary menu link to download a portable copy of your medical information. If you have questions, please visit the Frequently Asked Questions section of the Crave.com website. Remember, Crave.com is NOT to be used for urgent needs. For medical emergencies, dial 911. Now available from your iPhone and Android! Introducing Barak Lawson As a [employer] patient, I wanted to make you aware of our electronic visit tool called Barak Lawson. [employer] 24/7 allows you to connect within minutes with a medical provider 24 hours a day, seven days a week via a mobile device or tablet or logging into a secure website from your computer. You can access Barak Lawson from anywhere in the [place]. A virtual visit might be right for you when you have a simple condition and feel like you just dont want to get out of bed, or cant get away from work for an appointment, when your regular [employer] provider is not available (evenings, weekends or holidays), or when youre out of town and need minor care. Electronic visits cost only $49 and if the [employer] 24/7 provider determines a prescription is needed to treat your condition, one can be electronically transmitted to a nearby pharmacy*. Please take a moment to enroll today if you have not already done so. The enrollment process is free and takes just a few minutes.   To enroll, please download the [address] [place] [address] 24/7 jean to your tablet or phone, or visit [email or web address] to enroll on your computer. And, as an [address] [place] [address] patient with a [employer] account, the results of your visits will be scanned into your electronic medical record and your primary care provider will be able to view the scanned results. We urge you to continue to see your regular [address] [place] [address] provider for your ongoing medical care. And while your primary care provider may not be the one available when you seek a Selatra virtual visit, the peace of mind you get from getting a real diagnosis real time can be priceless. For more information on Selatra, view our Frequently Asked Questions (FAQs) at [email or web address]. Sincerely, 
 
Milady Cohn MD 
Chief Medical Officer Margaret Jeanine Mckeon *:  certain medications cannot be prescribed via Selatra Providers Seen During Your Hospitalization Provider Specialty Primary office phone Sandro Harris MD Orthopedic Surgery [phone] Your Primary Care Physician (PCP) Primary Care Physician Office Phone Office Fax Joseph Fernandez [phone] You are allergic to the following Allergen Reactions Bactrim (Sulfamethoprim) Nausea Only HEADACHE Oxycodone Nausea Only LIGHTHEADEDNESS Recent Documentation Height Weight BMI OB Status Smoking Status 1.6 m 91.6 kg 35.78 kg/m2 Postmenopausal Never Smoker Emergency Contacts Name Discharge Info Relation Home Work Mobile Rita Robert DISCHARGE CAREGIVER [3] Sister [23] [phone] Nicky Loja DISCHARGE CAREGIVER [3] Sister [23] [phone] Patient Belongings The following personal items are in your possession at time of discharge: 
  Dental Appliances: Partials  Visual Aid: Glasses, At bedside Discharge Instructions Attachments/References MEFS - RIVAROXABAN (XARELTO, XARELTO STARTER PACK) - (BY MOUTH) (ENGLISH) MEFS - CELECOXIB (CELEBREX) - (BY MOUTH) (ENGLISH) MEFS - OXYCODONE, RAPID RELEASE (ETH-OXYDOSE, OXY IR, ROXICODONE) - (BY MOUTH) (ENGLISH) Patient Handouts Rivaroxaban (Xarelto, Xarelto Starter Pack) - (By mouth) Why this medicine is used:  
Treats and prevents blood clots. Contact a nurse or doctor right away if you have: 
· Sudden or severe headache · Back pain, numbness, tingling, weakness in your legs or feet · Loss of bladder or bowel control · Bloody vomit or vomit that looks like coffee grounds; bloody or black, tarry stools · Bleeding that does not stop or bruises that do not heal  
 
Common side effects: · Minor bleeding or bruising © 2017 [address] Information is for End User's use only and may not be sold, redistributed or otherwise used for commercial purposes. Celecoxib (CeleBREX) - (By mouth) Why this medicine is used:  
Treats pain. Contact a nurse or doctor right away if you have: · Chest pain, shortness of breath · Numbness or weakness in your arm or leg, or on one side of your body · Severe stomach pain, vomiting blood, bloody or black tarry stools · Swelling in your hands, ankles, or feet; rapid weight gain · Change in how much or how often you urinate Common side effects: 
· Nausea, diarrhea · Mild skin rash · Headache © 2017 [address] Information is for End User's use only and may not be sold, redistributed or otherwise used for commercial purposes. Oxycodone, Rapid Release (ETH-Oxydose, Oxy IR, Roxicodone) - (By mouth) Why this medicine is used:  
Treats moderate to severe pain. This medicine is a narcotic pain reliever. Contact a nurse or doctor right away if you have: 
· Fast or slow heart beat, shallow breathing, blue lips, skin or fingernails · Anxiety, restlessness, fever, sweating, muscle spasms, twitching, seeing or hearing things that are not there · Extreme weakness, shallow breathing, slow heartbeat · Severe confusion, lightheadedness, dizziness, fainting · Sweating or cold, clammy skin, seizures · Severe constipation, stomach pain, nausea, vomiting Common side effects: · Mild constipation · Sleepiness, tiredness © 2017 [facility] Information is for End User's use only and may not be sold, redistributed or otherwise used for commercial purposes. Please provide this summary of care documentation to your next provider. Signatures-by signing, you are acknowledging that this After Visit Summary has been reviewed with you and you have received a copy. Patient Signature:  ____________________________________________________________ Date:  ____________________________________________________________  
  
Gini Artist Provider Signature:  ____________________________________________________________ Date:  ____________________________________________________________

## 2018-08-23 NOTE — IP AVS SNAPSHOT
[address] 
[phone] Patient: Timothy Yoder MRN: [id number] YPV:[id number]/[id number] A check lyudmila indicates which time of day the medication should be taken. My Medications START taking these medications Instructions Each Dose to Equal  
 Morning Noon Evening Bedtime  
 celecoxib 200 mg capsule Commonly known as:  CeleBREX Your last dose was: Your next dose is: Take 1 Cap by mouth daily for 30 days. 200 mg  
    
   
   
   
  
 oxyCODONE IR 5 mg immediate release tablet Commonly known as:  Danne Copper Your last dose was: Your next dose is: Take 1 Tab by mouth every four (4) hours as needed for Pain. Max Daily Amount: 30 mg.  
 5 mg  
    
   
   
   
  
 rivaroxaban 10 mg tablet Commonly known as:  Coleen Banks Your last dose was: Your next dose is: Take 1 Tab by mouth daily (with lunch) for 35 days. Indications: hip surgery deep vein thrombosis prevention 10 mg CONTINUE taking these medications Instructions Each Dose to Equal  
 Morning Noon Evening Bedtime  
 losartan 50 mg tablet Commonly known as:  COZAAR Your last dose was: Your next dose is: Take 50 mg by mouth daily. 50 mg  
    
   
   
   
  
 triamterene-hydroCHLOROthiazide 37.5-25 mg per tablet Commonly known as:  Catherine Divine Your last dose was: Your next dose is: Take 1 Tab by mouth daily. 1 Tab STOP taking these medications ALEVE 220 mg Cap Generic drug:  naproxen sodium  
   
  
 aspirin 81 mg chewable tablet  
   
  
 diclofenac EC 50 mg EC tablet Commonly known as:  VOLTAREN Where to Get Your Medications Information on where to get these meds will be given to you by the nurse or doctor. ! Ask your nurse or doctor about these medications  
  celecoxib 200 mg capsule  
 oxyCODONE IR 5 mg immediate release tablet  
 rivaroxaban 10 mg tablet

## 2018-08-23 NOTE — ANESTHESIA PREPROCEDURE EVALUATION
Anesthetic History   No history of anesthetic complications            Review of Systems / Medical History  Patient summary reviewed, nursing notes reviewed and pertinent labs reviewed    Pulmonary  Within defined limits                 Neuro/Psych   Within defined limits           Cardiovascular    Hypertension: well controlled              Exercise tolerance: >4 METS     GI/Hepatic/Renal  Within defined limits              Endo/Other        Arthritis     Other Findings            Physical Exam    Airway  Mallampati: II  TM Distance: > 6 cm  Neck ROM: normal range of motion   Mouth opening: Normal     Cardiovascular  Regular rate and rhythm,  S1 and S2 normal,  no murmur, click, rub, or gallop             Dental  No notable dental hx       Pulmonary  Breath sounds clear to auscultation               Abdominal  GI exam deferred       Other Findings            Anesthetic Plan    ASA: 2  Anesthesia type: spinal          Induction: Intravenous  Anesthetic plan and risks discussed with: Patient

## 2018-08-23 NOTE — PROGRESS NOTES
Bedside shift change report given to Ana Villavicencio (oncoming nurse) by Cely De La Torre (offgoing nurse). Report included the following information SBAR.

## 2018-08-23 NOTE — PERIOP NOTES
TRANSFER - OUT REPORT:    Verbal report given to Vitaliypatricia on [employer]  being transferred to room 577 for routine post - op       Report consisted of patients Situation, Background, Assessment and   Recommendations(SBAR). Time Pre op antibiotic given:  6417  Anesthesia Stop time:  9616  Gallardo Present on Transfer to floor: NO- gallardo removed 1058   Order for Gallardo on Chart:  N/a    Information from the following report(s) SBAR and MAR was reviewed with the receiving nurse. Opportunity for questions and clarification was provided. Is the patient on 02? YES       L/Min 2       Other     Is the patient on a monitor? NO    Is the nurse transporting with the patient? NO    Surgical Waiting Area notified of patient's transfer from PACU?  YES    Lines:   Peripheral IV 08/23/18 Right Hand (Active)   Site Assessment Clean, dry, & intact 8/23/2018 10:20 AM   Phlebitis Assessment 0 8/23/2018 10:20 AM   Infiltration Assessment 0 8/23/2018 10:20 AM   Dressing Status Clean, dry, & intact 8/23/2018 10:20 AM   Dressing Type Transparent 8/23/2018 10:20 AM   Hub Color/Line Status Infusing 8/23/2018 10:20 AM        The following personal items collected during your admission accompanied patient upon transfer:   Dental Appliance: Dental Appliances: Partials  Vision:    Hearing Aid:    Jewelry:    Clothing:    Other Valuables:    Valuables sent to safe:

## 2018-08-23 NOTE — ANESTHESIA POSTPROCEDURE EVALUATION
Post-Anesthesia Evaluation and Assessment    Patient: Timothy Yoder MRN: [id number]  SSN: [id number]    YOB: 1955  Age: 58 y.o. Sex: female       Cardiovascular Function/Vital Signs  Visit Vitals    BP (!) 111/92    Pulse 89    Temp 36.4 °C (97.6 °F)    Resp 16    Ht 5' 3\" (1.6 m)    Wt 91.6 kg (202 lb)    SpO2 99%    BMI 35.78 kg/m2       Patient is status post spinal anesthesia for Procedure(s):  RIGHT TOTAL HIP REPLACEMENT . Nausea/Vomiting: None    Postoperative hydration reviewed and adequate. Pain:  Pain Intensity 1: 4 (08/23/18 0622)   Managed    Neurological Status:   Neuro (WDL): Within Defined Limits (08/23/18 0617)   At baseline    Mental Status and Level of Consciousness: Arousable    Pulmonary Status:   O2 Device: CO2 nasal cannula (08/23/18 1014)   Adequate oxygenation and airway patent    Complications related to anesthesia: None    Post-anesthesia assessment completed.  No concerns    Signed By: Abram Rebolledo MD     August 23, 2018

## 2018-08-23 NOTE — ANESTHESIA PROCEDURE NOTES
Spinal Block    Start time: 8/23/2018 7:35 AM  End time: 8/23/2018 7:40 AM  Performed by: Arleen Doan  Authorized by: Arleen Doan     Pre-procedure:   Indications: primary anesthetic  Preanesthetic Checklist: patient identified, risks and benefits discussed, anesthesia consent, site marked, patient being monitored and timeout performed    Timeout Time: 07:35          Spinal Block:   Patient Position:  Seated  Prep Region:  Lumbar  Prep: Betadine      Location:  L2-3  Technique:  Single shot  Local:  Lidocaine 1%  Local Dose (mL):  3    Needle:   Needle Type:  Pencan  Needle Gauge:  24 G  Attempts:  1      Events: CSF confirmed, no blood with aspiration and no paresthesia        Assessment:  Insertion:  Uncomplicated  Patient tolerance:  Patient tolerated the procedure well with no immediate complications

## 2018-08-24 LAB
ANION GAP SERPL CALC-SCNC: 10 MMOL/L (ref 5–15)
BUN SERPL-MCNC: 19 MG/DL (ref 6–20)
BUN/CREAT SERPL: 16 (ref 12–20)
CALCIUM SERPL-MCNC: 7.5 MG/DL (ref 8.5–10.1)
CHLORIDE SERPL-SCNC: 104 MMOL/L (ref 97–108)
CO2 SERPL-SCNC: 26 MMOL/L (ref 21–32)
CREAT SERPL-MCNC: 1.18 MG/DL (ref 0.55–1.02)
GLUCOSE SERPL-MCNC: 132 MG/DL (ref 65–100)
HGB BLD-MCNC: 10 G/DL (ref 11.5–16)
INR PPP: 1.2 (ref 0.9–1.1)
POTASSIUM SERPL-SCNC: 3 MMOL/L (ref 3.5–5.1)
PROTHROMBIN TIME: 12.1 SEC (ref 9–11.1)
SODIUM SERPL-SCNC: 140 MMOL/L (ref 136–145)

## 2018-08-24 PROCEDURE — 74011000250 HC RX REV CODE- 250: Performed by: PHYSICIAN ASSISTANT

## 2018-08-24 PROCEDURE — 85018 HEMOGLOBIN: CPT | Performed by: PHYSICIAN ASSISTANT

## 2018-08-24 PROCEDURE — 74011250636 HC RX REV CODE- 250/636: Performed by: PHYSICIAN ASSISTANT

## 2018-08-24 PROCEDURE — 97116 GAIT TRAINING THERAPY: CPT

## 2018-08-24 PROCEDURE — 94760 N-INVAS EAR/PLS OXIMETRY 1: CPT

## 2018-08-24 PROCEDURE — 36415 COLL VENOUS BLD VENIPUNCTURE: CPT | Performed by: PHYSICIAN ASSISTANT

## 2018-08-24 PROCEDURE — 77010033678 HC OXYGEN DAILY

## 2018-08-24 PROCEDURE — 74011250637 HC RX REV CODE- 250/637: Performed by: PHYSICIAN ASSISTANT

## 2018-08-24 PROCEDURE — 80048 BASIC METABOLIC PNL TOTAL CA: CPT | Performed by: PHYSICIAN ASSISTANT

## 2018-08-24 PROCEDURE — 74011250637 HC RX REV CODE- 250/637: Performed by: NURSE PRACTITIONER

## 2018-08-24 PROCEDURE — 97535 SELF CARE MNGMENT TRAINING: CPT

## 2018-08-24 PROCEDURE — 97165 OT EVAL LOW COMPLEX 30 MIN: CPT

## 2018-08-24 PROCEDURE — 85610 PROTHROMBIN TIME: CPT | Performed by: PHYSICIAN ASSISTANT

## 2018-08-24 PROCEDURE — 65270000029 HC RM PRIVATE

## 2018-08-24 RX ORDER — LOSARTAN POTASSIUM 50 MG/1
50 TABLET ORAL DAILY
Status: DISCONTINUED | OUTPATIENT
Start: 2018-08-25 | End: 2018-08-25 | Stop reason: HOSPADM

## 2018-08-24 RX ORDER — OXYCODONE HYDROCHLORIDE 5 MG/1
2.5 TABLET ORAL
Status: DISCONTINUED | OUTPATIENT
Start: 2018-08-24 | End: 2018-08-25 | Stop reason: HOSPADM

## 2018-08-24 RX ORDER — CELECOXIB 200 MG/1
200 CAPSULE ORAL DAILY
Qty: 30 CAP | Refills: 0 | Status: SHIPPED | OUTPATIENT
Start: 2018-08-24 | End: 2018-09-03

## 2018-08-24 RX ORDER — POTASSIUM CHLORIDE 750 MG/1
10 TABLET, FILM COATED, EXTENDED RELEASE ORAL 2 TIMES DAILY
Status: COMPLETED | OUTPATIENT
Start: 2018-08-24 | End: 2018-08-25

## 2018-08-24 RX ORDER — OXYCODONE HYDROCHLORIDE 5 MG/1
5 TABLET ORAL
Qty: 60 TAB | Refills: 0 | Status: SHIPPED | OUTPATIENT
Start: 2018-08-24 | End: 2018-09-03

## 2018-08-24 RX ORDER — FAMOTIDINE 20 MG/1
20 TABLET, FILM COATED ORAL 2 TIMES DAILY
Status: DISCONTINUED | OUTPATIENT
Start: 2018-08-24 | End: 2018-08-25 | Stop reason: HOSPADM

## 2018-08-24 RX ORDER — OXYCODONE HYDROCHLORIDE 5 MG/1
5 TABLET ORAL
Status: DISCONTINUED | OUTPATIENT
Start: 2018-08-24 | End: 2018-08-25 | Stop reason: HOSPADM

## 2018-08-24 RX ADMIN — RIVAROXABAN 10 MG: 10 TABLET, FILM COATED ORAL at 13:20

## 2018-08-24 RX ADMIN — POTASSIUM CHLORIDE 10 MEQ: 750 TABLET, EXTENDED RELEASE ORAL at 13:20

## 2018-08-24 RX ADMIN — SODIUM CHLORIDE 125 ML/HR: 900 INJECTION, SOLUTION INTRAVENOUS at 08:37

## 2018-08-24 RX ADMIN — POTASSIUM CHLORIDE 10 MEQ: 750 TABLET, EXTENDED RELEASE ORAL at 18:51

## 2018-08-24 RX ADMIN — ACETAMINOPHEN 650 MG: 325 TABLET ORAL at 18:51

## 2018-08-24 RX ADMIN — POLYETHYLENE GLYCOL 3350 17 G: 17 POWDER, FOR SOLUTION ORAL at 10:16

## 2018-08-24 RX ADMIN — ACETAMINOPHEN 650 MG: 325 TABLET ORAL at 13:21

## 2018-08-24 RX ADMIN — ACETAMINOPHEN 650 MG: 325 TABLET ORAL at 08:37

## 2018-08-24 RX ADMIN — DOCUSATE SODIUM AND SENNOSIDES 1 TABLET: 8.6; 5 TABLET, FILM COATED ORAL at 10:16

## 2018-08-24 RX ADMIN — DOCUSATE SODIUM AND SENNOSIDES 1 TABLET: 8.6; 5 TABLET, FILM COATED ORAL at 18:51

## 2018-08-24 RX ADMIN — OXYCODONE HYDROCHLORIDE 2.5 MG: 5 TABLET ORAL at 10:16

## 2018-08-24 RX ADMIN — OXYCODONE HYDROCHLORIDE 2.5 MG: 5 TABLET ORAL at 18:52

## 2018-08-24 RX ADMIN — CELECOXIB 200 MG: 200 CAPSULE ORAL at 08:37

## 2018-08-24 RX ADMIN — Medication 10 ML: at 08:38

## 2018-08-24 NOTE — PROGRESS NOTES
Problem: Mobility Impaired (Adult and Pediatric)  Goal: *Acute Goals and Plan of Care (Insert Text)  Physical Therapy Goals  Initiated 8/23/2018    1. Patient will move from supine to sit and sit to supine , scoot up and down and roll side to side in bed with modified independence within 4 days. 2. Patient will perform sit to stand with modified independence within 4 days. 3. Patient will ambulate with modified independence for 150 feet with the least restrictive device within 4 days. 4. Patient will ascend/descend 8 stairs with 2 handrail(s) with modified independence within 4 days. 5. Patient will perform THR home exercise program per protocol with modified independence within 4 days. physical Therapy TREATMENT  Patient: Cheryl Pace (61 y.o. female)  Date: 8/24/2018  Diagnosis: DJD RIGHT HIP   Primary osteoarthritis of right hip Primary osteoarthritis of right hip  Procedure(s) (LRB):  RIGHT TOTAL HIP REPLACEMENT  (Right) 1 Day Post-Op  Precautions: Fall, WBAT  Chart, physical therapy assessment, plan of care and goals were reviewed. ASSESSMENT:  Patient received up in restroom with PCT assistance. Patient overall SBA for transfers with RW. Patient ambulated 225 feet with RW and SBA, progressing to step through pattern although remains antalgic. Patient able to navigate 4 stairs with B railings and CGA, cues for step to technique. Patient plans to discharge tomorrow. Remained OOB in chair at end of session. Will follow 1x tomorrow to ensure safety with stair navigation. Recommend HHPT. Progression toward goals:  [x]      Improving appropriately and progressing toward goals  []      Improving slowly and progressing toward goals  []      Not making progress toward goals and plan of care will be adjusted     PLAN:  Patient continues to benefit from skilled intervention to address the above impairments. Continue treatment per established plan of care.   Discharge Recommendations:  Home Health  Further Equipment Recommendations for Discharge: Owns RW     SUBJECTIVE:   I'm ready to get out of here tomorrow. OBJECTIVE DATA SUMMARY:   Functional Mobility Training:  Bed Mobility:     Supine to Sit:  (received sitting EOB)  Sit to Supine: Minimum assistance           Transfers:  Sit to Stand: Stand-by assistance  Stand to Sit: Stand-by assistance  Stand Pivot Transfers: Stand-by assistance     Bed to Chair: Minimum assistance                    Ambulation/Gait Training:  Distance (ft): 225 Feet (ft)  Assistive Device: Gait belt;Walker, rolling  Ambulation - Level of Assistance: Contact guard assistance        Gait Abnormalities: Antalgic;Decreased step clearance  Right Side Weight Bearing: As tolerated     Base of Support: Widened  Stance: Right decreased  Speed/Amy: Pace decreased (<100 feet/min)  Step Length: Right shortened;Left shortened                    Stairs:  Number of Stairs Trained: 4  Stairs - Level of Assistance: Contact guard assistance  Rail Use: Both    Therapeutic Exercises:   Exercises performed per protocol. See morning treatment note for description. Pain:  Pain Scale 1: Numeric (0 - 10)                 Activity Tolerance:   Good   Please refer to the flowsheet for vital signs taken during this treatment.   After treatment:   [x] Patient left in no apparent distress sitting up in chair  [] Patient left in no apparent distress in bed  [x] Call bell left within reach  [x] Nursing notified  [] Caregiver present  [] Bed alarm activated    COMMUNICATION/COLLABORATION:   The patients plan of care was discussed with: Registered Nurse    Esteban Burgess, PT   Time Calculation: 14 mins

## 2018-08-24 NOTE — PROGRESS NOTES
Care Management Interventions  PCP Verified by CM: Yes Fidelia Higgins MD)  Palliative Care Criteria Met (RRAT>21 & CHF Dx)?: No  Mode of Transport at Discharge: Other (see comment) (family)  Transition of Care Consult (CM Consult): Home Health, Discharge Planning  [address] [address]: No  [facility]  [place] [address] [facility] [address]: No  Discharge Durable Medical Equipment: No  Health Maintenance Reviewed: Yes  Physical Therapy Consult: Yes  Occupational Therapy Consult: Yes  Speech Therapy Consult: No  Confirm Follow Up Transport: Family  Plan discussed with Pt/Family/Caregiver: Yes  Freedom of Choice Offered: Yes   Resource Information Provided?: No  Discharge Location  Discharge Placement: Home with home health     Reason for Admission:   RIGHT TOTAL HIP REPLACEMENT                   RRAT Score:  3                   Plan for utilizing home health: offered freedom of choice, patient prefers [facility]. CM sent referral.     They have accepted case. Likelihood of Readmission:  low                         Transition of Care Plan:   Home with home health.     GODFREY Todd/CRM

## 2018-08-24 NOTE — PROGRESS NOTES
TOTAL HIP PROGRESS NOTE      Subjective:     Post-Operative Day: 1 Status Post right Total Hip Arthroplasty  Systemic or Specific Complaints:No Complaints    Objective:     Patient Vitals for the past 24 hrs:   BP Temp Pulse Resp SpO2   08/24/18 0406 - - - - 98 %   08/24/18 0007 106/66 97.9 °F (36.6 °C) 71 16 99 %   08/23/18 1549 149/89 - 85 - -   08/23/18 1546 134/86 - 80 - -   08/23/18 1544 127/76 - 74 - -   08/23/18 1438 129/71 98 °F (36.7 °C) 81 17 98 %   08/23/18 1341 127/76 97.5 °F (36.4 °C) 83 16 98 %   08/23/18 1244 149/68 97.5 °F (36.4 °C) 79 14 100 %   08/23/18 1135 125/70 97.5 °F (36.4 °C) 67 14 100 %   08/23/18 1045 136/72 - 78 13 100 %   08/23/18 1030 94/69 - 77 14 100 %   08/23/18 1025 93/69 - 82 17 97 %   08/23/18 1020 109/81 - 88 17 94 %   08/23/18 1015 109/44 - 90 27 99 %   08/23/18 1014 (!) 111/92 97.6 °F (36.4 °C) 89 16 99 %   08/23/18 1013 (!) 111/92 98 °F (36.7 °C) 90 21 99 %       General: alert, cooperative, no distress, appears stated age   Wound: Wound clean and dry no evidence of infection. , No Erythema, No Edema, No Drainage and Wound Intact   Motion: Painless Range of Motion   DVT Exam: No evidence of DVT seen on physical exam.  Negative Jean's sign. No cords or calf tenderness. No significant calf/ankle edema.      Data Review:    Recent Results (from the past 24 hour(s))   METABOLIC PANEL, BASIC    Collection Time: 08/24/18  2:51 AM   Result Value Ref Range    Sodium 140 136 - 145 mmol/L    Potassium 3.0 (L) 3.5 - 5.1 mmol/L    Chloride 104 97 - 108 mmol/L    CO2 26 21 - 32 mmol/L    Anion gap 10 5 - 15 mmol/L    Glucose 132 (H) 65 - 100 mg/dL    BUN 19 6 - 20 MG/DL    Creatinine 1.18 (H) 0.55 - 1.02 MG/DL    BUN/Creatinine ratio 16 12 - 20      GFR est AA 56 (L) >60 ml/min/1.73m2    GFR est non-AA 46 (L) >60 ml/min/1.73m2    Calcium 7.5 (L) 8.5 - 10.1 MG/DL   HEMOGLOBIN    Collection Time: 08/24/18  2:51 AM   Result Value Ref Range    HGB 10.0 (L) 11.5 - 16.0 g/dL   PROTHROMBIN TIME + INR    Collection Time: 08/24/18  2:51 AM   Result Value Ref Range    INR 1.2 (H) 0.9 - 1.1      Prothrombin time 12.1 (H) 9.0 - 11.1 sec         Assessment:     Status Post right Total Hip Arthroplasty. Doing well postoperatively. .  Bandage aquacell, clean/dry. Labs stable. PT progressing well. Pain control WNL. Plan:     Continues current post-op course  Continue PT per protocol.   Plan to discharge to Home [facility] [place] probable tomorrow

## 2018-08-24 NOTE — PROGRESS NOTES
Bedside and Verbal shift change report given to Rico Josehp (oncoming nurse) by Wendy Gil (offgoing nurse). Report included the following information SBAR.

## 2018-08-24 NOTE — OP NOTES
[address]   OPERATIVE REPORT    Susan Sutherland  MR#: [id number]  : 1955  ACCOUNT #: [de-identified]   DATE OF SERVICE: 2018    PREOPERATIVE DIAGNOSIS:  Advanced degenerative arthritis, right hip. POSTOPERATIVE DIAGNOSIS:  Advanced degenerative arthritis, right hip. PROCEDURE PERFORMED:  Right total hip replacement. ANESTHESIA:  Spinal.    ESTIMATED BLOOD LOSS:  200 mL. DRAINS:  None. SPECIMENS REMOVED:  Right femoral head. SURGEON. Wendy Wu MD    ASSISTANT:  Delgado Shipman PA-C    IMPLANTS:  Right total hip components as listed in operative report. COMPLICATIONS:  None. INDICATIONS:  The patient has degenerative arthritis of her knees, but has more severe degenerative arthritis of the right hip. She has failed conservative treatment, now presents for the above procedure. PROCEDURE:  On day of operation, the patient was taken to the operating room, spinal anesthesia administered. The patient was then placed in the left lateral decubitus position and positioned with a [place] positioner. The right hip was prepped and draped in the usual fashion. A mini posterolateral incision was made over the hip. It was carried through subcutaneous tissue. Tensor fascia oanh was sharply divided. Fascia of the gluteal muscles was divided in line with their fibers. Charnley retractors were carefully placed. External rotator muscles were taken down. A T-shaped posterior capsular incision made. The hip was dislocated noting marked degenerative changes. Oscillating saw was used to make the femoral neck osteotomy. Retractors were carefully placed around the acetabulum. Acetabulum was cleared of osteophytes and soft tissue. The acetabulum was then reamed to 51 mm and felt to have good coverage and good bone quality at this point.   A 52 mm Timbo Porocoat Gription technology acetabular component was press-fit into place and felt to have a tight fit.  One superior screw placed. The 36 mm trial liner placed. Attention was then turned to the femur. Box osteotome was utilized. Femur was reamed to a #6 in the [place] system. It was broached to a #6 in the [place] system and felt to have a tight fit. X-rays revealed good position of the components. Various head and neck trials were utilized. The standard 1.5 provided the best fit, range of motion, with proper stability in all planes of motion and some anterior tissue was removed to prevent impingement. Leg lengths were felt to be appropriate. The trial components were then removed. 36 mm polyethylene liner placed in the acetabulum. Attention was then turned to the femur. A #6 standard [place] Porocoated femoral stem was press-fit into place and felt to have a tight fit. The 1.5 x 36 ceramic head was introduced and reduced, felt to be stable. Incision was thoroughly irrigated. Coagulation achieved with electrocautery. The posterior capsule repaired with #1 Vicryl suture. The fascia closed with #1 Vicryl supplemented with #2 PDS, 2-0 Vicryl was used for subcutaneous tissue and staples used to close the skin. Sterile dressings were applied. The patient was taken to recovery room in satisfactory condition. The assistant, Jennifer Diaz PA-C assisted me with positioning, retraction, implant installation and incision closure. LOCKETT W. Washington Holter, MD LWG / Destinee Gunn  D: 08/23/2018 14:16     T: 08/23/2018 21:14  JOB #: [id number]

## 2018-08-24 NOTE — PROGRESS NOTES
Problem: Mobility Impaired (Adult and Pediatric)  Goal: *Acute Goals and Plan of Care (Insert Text)  Physical Therapy Goals  Initiated 8/23/2018    1. Patient will move from supine to sit and sit to supine , scoot up and down and roll side to side in bed with modified independence within 4 days. 2. Patient will perform sit to stand with modified independence within 4 days. 3. Patient will ambulate with modified independence for 150 feet with the least restrictive device within 4 days. 4. Patient will ascend/descend 8 stairs with 2 handrail(s) with modified independence within 4 days. 5. Patient will perform THR home exercise program per protocol with modified independence within 4 days. physical Therapy TREATMENT  Patient: Sorin Pool (61 y.o. female)  Date: 8/24/2018  Diagnosis: DJD RIGHT HIP   Primary osteoarthritis of right hip Primary osteoarthritis of right hip  Procedure(s) (LRB):  RIGHT TOTAL HIP REPLACEMENT  (Right) 1 Day Post-Op  Precautions: Fall, WBAT  Chart, physical therapy assessment, plan of care and goals were reviewed. ASSESSMENT:  Patient received sitting up in chair, pain improved from earlier. She's overall CGA for transfers with RW. Patient ambulated 175 feet with RW and CGA. Patient able to progress to step through pattern with cues. Patient with good balance throughout session. Able to complete seated and standing exercises as described. Agreeable to trial steps in PM if pain remains controlled. Anticipate discharge tomorrow. Recommend HHPT. Progression toward goals:  [x]      Improving appropriately and progressing toward goals  []      Improving slowly and progressing toward goals  []      Not making progress toward goals and plan of care will be adjusted     PLAN:  Patient continues to benefit from skilled intervention to address the above impairments. Continue treatment per established plan of care.   Discharge Recommendations:  Home Health  Further Equipment Recommendations for Discharge: Owns RW     SUBJECTIVE:   Patient stated I feel good now.     OBJECTIVE DATA SUMMARY:   Critical Behavior:  Neurologic State: Alert  Orientation Level: Oriented X4  Cognition: Appropriate for age attention/concentration  Safety/Judgement: Good awareness of safety precautions  Functional Mobility Training:  Bed Mobility:     Supine to Sit:  (received sitting EOB)  Sit to Supine: Minimum assistance           Transfers:  Sit to Stand: Contact guard assistance  Stand to Sit: Contact guard assistance  Stand Pivot Transfers: Contact guard assistance     Bed to Chair: Minimum assistance                    Balance:  Sitting: Intact  Standing: Intact; With support  Ambulation/Gait Training:  Distance (ft): 175 Feet (ft)  Assistive Device: Gait belt;Walker, rolling  Ambulation - Level of Assistance: Contact guard assistance        Gait Abnormalities: Antalgic;Decreased step clearance  Right Side Weight Bearing: As tolerated     Base of Support: Widened  Stance: Right decreased  Speed/Amy: Pace decreased (<100 feet/min)  Step Length: Right shortened;Left shortened                    Stairs:              Therapeutic Exercises:   SUPINE  EXERCISES   Sets   Reps   Active Active Assist   Passive Self ROM   Comments   Ankle Pumps 1 10 [x]                                        []                                        []                                        []                                           LAQ 1 10 [x]                                        []                                        []                                        []                                           Heel Slides  (seated) 1 10 [x]                                        []                                        []                                        []                                           Hip Abduction   []                                        []                                        [] []                                           Glut Sets 1 10 [x]                                        []                                        []                                        []                                              []                                        []                                        []                                        []                                              []                                        []                                        []                                        []                                             STANDING  EXERCISES   Sets   Reps   Active Active Assist   Passive Self ROM   Comments   Heel Raises 1 10 [x]                                        []                                        []                                        []                                           Hip Abduction 1 10 [x]                                        []                                        []                                        []                                           Hamstring Curls 1 10 [x]                                        []                                        []                                        []                                              []                                        []                                        []                                        []                                             Pain:  Pain Scale 1: Numeric (0 - 10)  Pain Intensity 1: 4  Pain Location 1: Hip  Pain Orientation 1: Right  Pain Description 1: Aching  Pain Intervention(s) 1: Emotional support;Declines;Repositioned  Activity Tolerance:   Good  Please refer to the flowsheet for vital signs taken during this treatment.   After treatment:   [] Patient left in no apparent distress sitting up in chair  [x] Patient left in no apparent distress in bed  [x] Call bell left within reach  [x] Nursing notified  [] Caregiver present  [] Bed alarm activated    COMMUNICATION/COLLABORATION:   The patients plan of care was discussed with: Registered Nurse    Hope Herrera, PT   Time Calculation: 17 mins

## 2018-08-24 NOTE — PROGRESS NOTES
The patient continues to have nausea vomiting, patient was given Zofran. The patient was also having an increase in patient  Stating that the 15 mg of Norco dis not help her pain for any length of time.

## 2018-08-24 NOTE — PROGRESS NOTES
Problem: Self Care Deficits Care Plan (Adult)  Goal: *Acute Goals and Plan of Care (Insert Text)  Occupational Therapy Goals  Initiated: 8/24/2018   1. Patient will perform grooming with supervision/set-up standing at sink within 3 day(s). 2.  Patient will perform bathing with supervision/set-up from chair within 3 day(s). 3.  Patient will perform upper body dressing and lower body dressing with supervision/set-up within 3 day(s). 4.  Patient will perform toilet transfers with supervision/set-up within 3 day(s). 5.  Patient will perform all aspects of toileting with supervision/set-up within 3 day(s). Occupational Therapy EVALUATION  Patient: Katelyn Joel (61 y.o. female)  Date: 8/24/2018  Primary Diagnosis: DJD RIGHT HIP   Primary osteoarthritis of right hip  Procedure(s) (LRB):  RIGHT TOTAL HIP REPLACEMENT  (Right) 1 Day Post-Op   Precautions:   Fall, WBAT    ASSESSMENT :  Based on the objective data described below, the patient presents with decreased independence with self care and functional mobility following admission for R THR. Pt was able to progress with self care and bathing from sitting EOB. Pt needing mod A for lower body access and noted with difficulty reaching to R LE. Pt will benefit from AE training if desired for discharge home. Unable to complete AE training this morning due to nursing interference with ADL (nursing assisted with dressing her). Pt plans to discharge home tomorrow with family support and assistance. Patient will benefit from skilled intervention to address the above impairments.   Patients rehabilitation potential is considered to be Good  Factors which may influence rehabilitation potential include:   []             None noted  []             Mental ability/status  []             Medical condition  []             Home/family situation and support systems  []             Safety awareness  [x]             Pain tolerance/management  []             Other:      PLAN :  Recommendations and Planned Interventions:  [x]               Self Care Training                  [x]        Therapeutic Activities  [x]               Functional Mobility Training    []        Cognitive Retraining  [x]               Therapeutic Exercises           [x]        Endurance Activities  [x]               Balance Training                   []        Neuromuscular Re-Education  []               Visual/Perceptual Training     [x]   Home Safety Training  [x]               Patient Education                 [x]        Family Training/Education  []               Other (comment):    Frequency/Duration: Patient will be followed by occupational therapy 5 times a week to address goals. Discharge Recommendations: None  Further Equipment Recommendations for Discharge: possible hip kit     SUBJECTIVE:   Patient stated I am feeling very sore and painful.     OBJECTIVE DATA SUMMARY:   HISTORY:   Past Medical History:   Diagnosis Date    Anxiety     Arthritis     Hypertension     BORDERLINE     Past Surgical History:   Procedure Laterality Date    HX COLONOSCOPY         Prior Level of Function/Environment/Context: pt was  Independent at home prior to surgery.    Occupations in which the patient is/was successful, what are the barriers preventing that success:   Performance Patterns (routines, roles, habits, and rituals):   Personal Interests and/or values:   Expanded or extensive additional review of patient history:     Home Situation  Home Environment: Private residence  # Steps to Enter: 8  Rails to Enter: Yes  Hand Rails : Bilateral  One/Two Story Residence: Two story, live on 1st floor  Living Alone: No  Support Systems: Family member(s)  Patient Expects to be Discharged to[de-identified] Private residence  Current DME Used/Available at Home: Cane, straight  Tub or Shower Type: Shower    Hand dominance: Right    EXAMINATION OF PERFORMANCE DEFICITS:  Cognitive/Behavioral Status:  Neurologic State: Alert  Orientation Level: Oriented X4  Cognition: Appropriate for age attention/concentration  Perception: Appears intact  Perseveration: No perseveration noted  Safety/Judgement: Good awareness of safety precautions    Skin: dressing intact     Edema: none noted    Hearing: Auditory  Auditory Impairment: None    Vision/Perceptual:                           Acuity: Within Defined Limits         Range of Motion:    AROM: Within functional limits  PROM: Within functional limits                      Strength:    Strength: Within functional limits                Coordination:  Coordination: Within functional limits  Fine Motor Skills-Upper: Right Intact; Left Intact    Gross Motor Skills-Upper: Right Intact; Left Intact    Tone & Sensation:    Tone: Normal  Sensation: Intact                      Balance:  Sitting: Intact  Standing: Intact; With support    Functional Mobility and Transfers for ADLs:  Bed Mobility:  Supine to Sit:  (received sitting EOB)  Sit to Supine:  (remained in chair)    Transfers:  Sit to Stand: Minimum assistance  Stand to Sit: Minimum assistance  Bed to Chair: Minimum assistance  Toilet Transfer : Minimum assistance    ADL Assessment:  Feeding: Supervision    Oral Facial Hygiene/Grooming: Supervision    Bathing: Moderate assistance    Upper Body Dressing: Supervision    Lower Body Dressing: Moderate assistance    Toileting: Minimum assistance                ADL Intervention and task modifications:   pt completed bathing and dressing form EOB and did well with all activities.   She does have difficulty with lower body access but nursing noted with helping her     Cognitive Retraining  Safety/Judgement: Good awareness of safety precautions    Functional Measure:  Barthel Index:    Bathin  Bladder: 10  Bowels: 10  Groomin  Dressin  Feeding: 10  Mobility: 5  Stairs: 0  Toilet Use: 5  Transfer (Bed to Chair and Back): 10  Total: 60       Barthel and G-code impairment scale:  Percentage of impairment CH  0% CI  1-19% CJ  20-39% CK  40-59% CL  60-79% CM  80-99% CN  100%   Barthel Score 0-100 100 99-80 79-60 59-40 20-39 1-19   0   Barthel Score 0-20 20 17-19 13-16 9-12 5-8 1-4 0      The Barthel ADL Index: Guidelines  1. The index should be used as a record of what a patient does, not as a record of what a patient could do. 2. The main aim is to establish degree of independence from any help, physical or verbal, however minor and for whatever reason. 3. The need for supervision renders the patient not independent. 4. A patient's performance should be established using the best available evidence. Asking the patient, friends/relatives and nurses are the usual sources, but direct observation and common sense are also important. However direct testing is not needed. 5. Usually the patient's performance over the preceding 24-48 hours is important, but occasionally longer periods will be relevant. 6. Middle categories imply that the patient supplies over 50 per cent of the effort. 7. Use of aids to be independent is allowed. Shante Lara., Barthel, D.W. (0340). Functional evaluation: the Barthel Index. [address] [facility] (14)2. Lauro Skaggs carli [place], RONELF, Olinda Tucker., Garo Avalos., [place], [address] [place] [address] (1999). Measuring the change indisability after inpatient rehabilitation; comparison of the responsiveness of the Barthel Index and Functional [place] Measure. Journal of Neurology, Neurosurgery, and Psychiatry, 66(4), 087-347. Trenton Glaser, HELEN.[email or web address].BRITTA, GARLAND Meyer, & Slick Walton M.A. (2004.) Assessment of post-stroke quality of life in cost-effectiveness studies: The usefulness of the Barthel Index and the EuroQoL-5D. Quality of Life Research, 13, 168-91     G codes: In compliance with CMSs Claims Based Outcome Reporting, the following G-code set was chosen for this patient based on their primary functional limitation being treated:     The outcome measure chosen to determine the severity of the functional limitation was the Barthel Index with a score of 60/100 which was correlated with the impairment scale. ? Self Care:     - CURRENT STATUS: CJ - 20%-39% impaired, limited or restricted    - GOAL STATUS: CI - 1%-19% impaired, limited or restricted    - D/C STATUS:  ---------------To be determined---------------     Occupational Therapy Evaluation Charge Determination   History Examination Decision-Making   LOW Complexity : Brief history review  MEDIUM Complexity : 3-5 performance deficits relating to physical, cognitive , or psychosocial skils that result in activity limitations and / or participation restrictions MEDIUM Complexity : Patient may present with comorbidities that affect occupational performnce. Miniml to moderate modification of tasks or assistance (eg, physical or verbal ) with assesment(s) is necessary to enable patient to complete evaluation       Based on the above components, the patient evaluation is determined to be of the following complexity level: LOW   Pain:  Pain Scale 1: Numeric (0 - 10)  Pain Intensity 1: 4  Pain Location 1: Hip  Pain Orientation 1: Right  Pain Description 1: Aching  Pain Intervention(s) 1: Emotional support;Declines;Repositioned  Activity Tolerance:   VSS throughout session. After treatment:   [x] Patient left in no apparent distress sitting up in chair  [] Patient left in no apparent distress in bed  [x] Call bell left within reach  [x] Nursing notified  [] Caregiver present  [] Bed alarm activated    COMMUNICATION/EDUCATION:   The patients plan of care was discussed with: Physical Therapist and Registered Nurse. [x] Home safety education was provided and the patient/caregiver indicated understanding. [] Patient/family have participated as able in goal setting and plan of care. [] Patient/family agree to work toward stated goals and plan of care. [] Patient understands intent and goals of therapy, but is neutral about his/her participation.   [] Patient is unable to participate in goal setting and plan of care. This patients plan of care is appropriate for delegation to [place].     Thank you for this referral.  Paloma Haney, OT  Time Calculation: 31 mins

## 2018-08-24 NOTE — PROGRESS NOTES
I called and talked with Dr. Dutch Waggoner him of the patient's nausea and vomiting. I also informed him that the patient's pain was not being controlled with the Norco 15 mg. Per Dr. Darien Benoit the patient's [place] Rex can be increased to 20 mg for a one time dose. The patient was given the Zofran and 1 mg of IV Dilaudid.

## 2018-08-24 NOTE — PROGRESS NOTES
Renal Dosing/Monitoring  Medication: famotidine   Current regimen:  20 mg every 24 hr  Recent Labs      08/24/18   0251   CREA  1.18*   BUN  19     Estimated CrCl:  >50 ml/min  Plan: Change to 20 mg bid per renal dosing protocol

## 2018-08-25 VITALS
SYSTOLIC BLOOD PRESSURE: 132 MMHG | HEIGHT: 63 IN | DIASTOLIC BLOOD PRESSURE: 70 MMHG | BODY MASS INDEX: 35.79 KG/M2 | WEIGHT: 202 LBS | TEMPERATURE: 97.3 F | OXYGEN SATURATION: 98 % | RESPIRATION RATE: 18 BRPM | HEART RATE: 87 BPM

## 2018-08-25 PROCEDURE — 97110 THERAPEUTIC EXERCISES: CPT

## 2018-08-25 PROCEDURE — 77030012893

## 2018-08-25 PROCEDURE — 74011250637 HC RX REV CODE- 250/637: Performed by: NURSE PRACTITIONER

## 2018-08-25 PROCEDURE — 74011000250 HC RX REV CODE- 250: Performed by: PHYSICIAN ASSISTANT

## 2018-08-25 PROCEDURE — 97116 GAIT TRAINING THERAPY: CPT

## 2018-08-25 PROCEDURE — 74011250637 HC RX REV CODE- 250/637: Performed by: PHYSICIAN ASSISTANT

## 2018-08-25 RX ADMIN — ACETAMINOPHEN 650 MG: 325 TABLET ORAL at 11:01

## 2018-08-25 RX ADMIN — LOSARTAN POTASSIUM 50 MG: 50 TABLET ORAL at 09:13

## 2018-08-25 RX ADMIN — DOCUSATE SODIUM AND SENNOSIDES 1 TABLET: 8.6; 5 TABLET, FILM COATED ORAL at 09:13

## 2018-08-25 RX ADMIN — FAMOTIDINE 20 MG: 20 TABLET ORAL at 09:24

## 2018-08-25 RX ADMIN — RIVAROXABAN 10 MG: 10 TABLET, FILM COATED ORAL at 11:01

## 2018-08-25 RX ADMIN — POLYETHYLENE GLYCOL 3350 17 G: 17 POWDER, FOR SOLUTION ORAL at 09:13

## 2018-08-25 RX ADMIN — FAMOTIDINE 20 MG: 20 TABLET ORAL at 00:10

## 2018-08-25 RX ADMIN — POTASSIUM CHLORIDE 10 MEQ: 750 TABLET, EXTENDED RELEASE ORAL at 09:13

## 2018-08-25 RX ADMIN — ACETAMINOPHEN 650 MG: 325 TABLET ORAL at 00:09

## 2018-08-25 RX ADMIN — OXYCODONE HYDROCHLORIDE 5 MG: 5 TABLET ORAL at 10:56

## 2018-08-25 NOTE — DISCHARGE INSTRUCTIONS
DC Orders All Total Hips    Case Management for DC planning to Home HC .   - PT 2 times a week for 2 weeks; 50% WBAT with AVOID sudden and extreme movement of your hip (surgical leg). - Xarelto therapy once daily for 35 days post-operatively   - Remove staples at 2 weeks post-op; 9/6/18 .   - Follow up in Office in 2 weeks. After Hospital Care Plan:  Discharge Instructions Hip Replacement-Dr. Percy Marin    Patient Name: Maco Rocha  Date of procedure: 8/23/2018    Procedure: Procedure(s):  RIGHT TOTAL HIP REPLACEMENT   Surgeon: Anjali Negro) and Role:     * Wauneta Goldberg, MD - Primary     PCP: Sharon Pereira MD  Date of discharge: No discharge date for patient encounter. Follow up appointments   Follow up with Dr. Percy Marin in 2 weeks. Call [phone] to make an appointment.  If home health has been arranged for you the agency will contact you to arrange dates/times for visits. Please call them if you do not hear from them within 24 hours after you are discharged    When to call your Orthopaedic Surgeon: Call [phone].  If you call after 5pm or on a weekend, the on call physician will be contacted   Increased hip pain, unrelieved pain or if you have difficulty or are unable to walk   Signs of infection-if your incision is red; continues to have drainage; drainage has a foul odor or if you have a persistent fever over 101 degrees   Signs of a blood clot in your leg-calf pain, tenderness, redness, swelling of lower leg    When to call your Primary Care Physician:   Concerns about medical conditions such as diabetes, high blood pressure, asthma, congestive heart failure   Call if blood sugars are elevated, persistent headache or dizziness, coughing or congestion, constipation or diarrhea, burning with urination, abnormal heart rate    When to call [id number] go to the nearest emergency room   acute onset of chest pain, shortness of breath, difficulty breathing    Activity   50% weight bearing with walker or crutches for 2 weeks, then as tolerated. Refer to pages 23-33 of your handbook for instructions and pictures   Complete you Home Exercise Program daily as instructed by your therapist. Refer to pages 36-42 of your handbook for instructions and pictures   Get up every one hour and walk (except at night when sleeping)   AVOID sudden and extreme movement of your hip (surgical leg)   Do not drive or operate heavy machinery    Incision Care   The Aquacel (brown, waterproof) surgical dressing is to remain on your hip for 7 days. On the 7th day have someone gently peel the dressing off by carefully lifting the edge and stretching it slightly to break the adhesive seal   You will have staples in your hip incision. They will be removed by the home health agency staff   If your Aquacel dressing comes loose/off before the 7th day, you may replace it with a dry sterile gauze dressing; change it daily. Once your incision is not draining, you may leave it open to air   You may take a shower with the Aquacel dressing in place. Once the Aquacel is removed, you may shower and get your incision wet but do not submerge your incision under water in a bath tub, hot tub or swimming pool for 6 weeks after surgery. Preventing blood clots   Take Xarelto 10 mg once daily for 35 days post-operatively   Wear elastic stockings (TEDS) for 4 weeks. You should remove them for approximately 1 hour daily for showering/sponge bathing    Pain management   Take pain medication as prescribed; decrease the amount you use as your pain lessens   Avoid alcoholic beverages while taking pain medication   Please be aware that many medications contain Tylenol. We do not want you to over medicate so please read the information below as a guide. Do not take more than 4 Grams of Tylenol in a 24 hour period.   (There are 1000 milligrams in one Gram)  o Percocet contains 325 mg of Tylenol per tablet (do not take more than 12 tablets in 24 hours)  o Lortab contains 500 mg of Tylenol per tablet (do not take more than 8 tablets in 24 hours)  o Norco contains 325 mg of Tylenol per tablet (do not take more than 12 tablets in 24 hours).  You may place an ice bag on your hip for 15-20 minutes after exercising and as needed throughout the day and night    Diet   Resume usual diet; drink plenty of fluids; eat foods high in fiber   You may want to take a stool softener (such as Senokot-S or Colace) to prevent constipation while you are taking pain medication. If constipation occurs, take a laxative (such as Dulcolax tablets, Milk of Magnesia, or a suppository)    [facility] Protocol (to be followed by [facility] Tyrell Coles gayle)  Nursing-see physicians order   Remove staples per physicians order   Complete head to toe assessment, vital signs   Medication reconciliation   Review pain management   Manage chronic medical conditions    Physical Therapy-see physician's order     Weight bearing status:  Precautions at Admission: WBAT (50%)     Right Side Weight Bearing: As tolerated  ? Mobility Status:  Supine to Sit: Minimum assistance  Sit to Stand: Minimum assistance  Sit to Supine: Minimum assistance       Gait:  Distance (ft): 35 Feet (ft)  Ambulation - Level of Assistance: Contact guard assistance  Assistive Device: Gait belt, Walker, rolling  Gait Abnormalities: Antalgic, Decreased step clearance    ADL status overall composite:                 Physical Therapy   Assessment and evaluation-bed mobility; functional transfers (bed, chair, bathroom, stairs); ambulation with equipment, car transfers, safety and ability to get out of house in the event of an emergency   50% weight bearing x 2 weeks then weight bearing as tolerated   AVOID sudden and extreme movement of the hip (surgical leg)   Discuss pain management   Review how to do ADLs.   Refer to pages 43-47 of handbook    Home Exercise Program-refer to pages 36-42 of handbook

## 2018-08-25 NOTE — PROGRESS NOTES
Problem: Falls - Risk of  Goal: *Absence of Falls  Document Hema Fall Risk and appropriate interventions in the flowsheet.    Outcome: Progressing Towards Goal  Fall Risk Interventions:  Mobility Interventions: Patient to call before getting OOB         Medication Interventions: Patient to call before getting OOB    Elimination Interventions: Patient to call for help with toileting needs

## 2018-08-25 NOTE — PROGRESS NOTES
The Joint Replacement Discharge Education video was reviewed by the patient/family. The content was discussed utilizing teach back, questions were answered. The patient verbalized an understanding of the instructions. I have reviewed discharge instructions with the patient. The patient verbalized understanding. Pt received rxs via outpatient pharmacy. Pt was d/c home via wheelchair by volunteer.

## 2018-09-02 ENCOUNTER — HOSPITAL ENCOUNTER (OUTPATIENT)
Age: 63
Setting detail: OBSERVATION
Discharge: HOME | End: 2018-09-03
Attending: INTERNAL MEDICINE | Admitting: HOSPITALIST
Payer: COMMERCIAL

## 2018-09-02 PROCEDURE — 65270000029 HC RM PRIVATE

## 2018-09-02 NOTE — IP AVS SNAPSHOT
[address] 
[phone] Patient: Sidney Scott MRN: [id number] NCO:5/86/7457 About your hospitalization You were admitted on:  September 2, 2018 You last received care in the:  [address] [place] [address] You were discharged on:  September 3, 2018 Why you were hospitalized Your primary diagnosis was:  Not on File Your diagnoses also included:  Deep Vein Thrombosis (Dvt) Of Lower Extremity (Hcc) Follow-up Information Follow up With Details Comments Contact Info José Miguel Garcia MD In 1 month  [address] [id number] [phone] Cheyanne Hayward MD On 9/10/2018  [address] [facility] [address] 
[phone] Discharge Orders None A check lyudmila indicates which time of day the medication should be taken. My Medications START taking these medications Instructions Each Dose to Equal  
 Morning Noon Evening Bedtime * apixaban 5 mg tablet Commonly known as:  Sukidelle Patient Your last dose was: Your next dose is: Take 10 mg (2 tabs by mouth) twice daily x 7 days and then drop to 5 mg (1 tab by mouth) twice a day. * apixaban 5 mg tablet Commonly known as:  Sukidelle Patient Start taking on:  10/1/2018 Your last dose was: Your next dose is: Take 1 Tab by mouth two (2) times a day. 5 mg * Notice: This list has 2 medication(s) that are the same as other medications prescribed for you. Read the directions carefully, and ask your doctor or other care provider to review them with you. CONTINUE taking these medications Instructions Each Dose to Equal  
 Morning Noon Evening Bedtime  
 losartan 50 mg tablet Commonly known as:  COZAAR Your last dose was: Your next dose is: Take 50 mg by mouth daily.   
 50 mg  
    
   
   
   
  
 triamterene-hydroCHLOROthiazide 37.5-25 mg per tablet Commonly known as:  Marito Townsend Your last dose was: Your next dose is: Take 1 Tab by mouth daily. 1 Tab STOP taking these medications   
 celecoxib 200 mg capsule Commonly known as:  CeleBREX  
   
  
 oxyCODONE IR 5 mg immediate release tablet Commonly known as:  ROXICODONE  
   
  
 rivaroxaban 10 mg tablet Commonly known as:  [place] Redo Where to Get Your Medications [facility] [address] [address] Phone:  [phone]  
  apixaban 5 mg tablet Information on where to get these meds will be given to you by the nurse or doctor. ! Ask your nurse or doctor about these medications  
  apixaban 5 mg tablet Discharge Instructions Discharge Instructions PATIENT ID: Judy Segovia MRN: [id number] YOB: 1955 DATE OF ADMISSION: 9/2/2018 11:51 PM   
DATE OF DISCHARGE: 9/3/2018 PRIMARY CARE PROVIDER: Magdi Watson MD  
ATTENDING PHYSICIAN: Gladys Mackenzie MD 
DISCHARGING PROVIDER: Anyi Flores NP. To contact this individual call [phone] and ask the  to page. If unavailable ask to be transferred the Adult Hospitalist Department. DISCHARGE DIAGNOSES: 
DVT post hip surgery CONSULTATIONS: IP CONSULT TO HEMATOLOGY 
 
FOLLOW UP APPOINTMENTS:  
Follow-up Information Follow up With Details Comments Contact Info Magdi Watson MD In 1 month  [address] [address] 
[phone] Beck Rice MD On 9/10/2018  [address] [facility] [address] 
[phone] ADDITIONAL CARE RECOMMENDATIONS: 
Gorge Peralta Start Eliquis these evening, as directed. 10 mg (2 tabs) twice daily x 7 days and then start 5 mg (1 tab) twice daily.  You will take this medication for ~ 3 months for a PROVOKED DVT (most likely due to hip surgery). Your PCP can manage this. Two prescriptions are provided for Eliquis (first was sent directly to [facility] in [place]) and then second will be given as a paper copy. Take Tylenol for pain as needed. Consider starting daily iron - follow up with PCP regarding anemia monitoring/colonoscopy needs DIET: Regular Diet ACTIVITY: as per [facility] PT 
WOUND CARE: Staples out per Ortho discretion/orders EQUIPMENT needed: none new · It is important that you take the medication exactly as they are prescribed. · Keep your medication in the bottles provided by the pharmacist and keep a list of the medication names, dosages, and times to be taken in your wallet. · Do not take other medications without consulting your doctor. NOTIFY YOUR PHYSICIAN FOR ANY OF THE FOLLOWING:  
Fever over 101 degrees for 24 hours. Chest pain, shortness of breath, fever, chills, nausea, vomiting, diarrhea, change in mentation, falling, weakness, bleeding. Severe pain or pain not relieved by medications. Or, any other signs or symptoms that you may have questions about. DISPOSITION: 
  Home With: 
 OT  PT  [facility]  RN  
  
 SNF/Inpatient Rehab/LTAC Independent/assisted living Hospice  
xx Other: Home, resume home care CDMP Checked:  
Yes *x* PROBLEM LIST Updated: 
Yes *x* Signed:  
Marianne Walls NP 
9/3/2018 11:44 AM 
 
 
  
  
  
Ubiregi Announcement We are excited to announce that we are making your provider's discharge notes available to you in Ubiregi. You will see these notes when they are completed and signed by the physician that discharged you from your recent hospital stay. If you have any questions or concerns about any information you see in Ubiregi, please call the Health Information Department where you were seen or reach out to your Primary Care Provider for more information about your plan of care. Introducing [facility]! [employer] introduces [employer] patient portal. Now you can access parts of your medical record, email your doctor's office, and request medication refills online. 1. In your internet browser, go to [email or web address] 2. Click on the First Time User? Click Here link in the Sign In box. You will see the New Member Sign Up page. 3. Enter your clickTRUE Access Code exactly as it appears below. You will not need to use this code after youve completed the sign-up process. If you do not sign up before the expiration date, you must request a new code. · clickTRUE Access Code: [email or web address] Expires: 11/14/2018 11:33 AM 
 
4. Enter the last four digits of your Social Security Number (xxxx) and Date of Birth (mm/dd/yyyy) as indicated and click Submit. You will be taken to the next sign-up page. 5. Create a clickTRUE ID. This will be your clickTRUE login ID and cannot be changed, so think of one that is secure and easy to remember. 6. Create a clickTRUE password. You can change your password at any time. 7. Enter your Password Reset Question and Answer. This can be used at a later time if you forget your password. 8. Enter your e-mail address. You will receive e-mail notification when new information is available in [address] 9. Click Sign Up. You can now view and download portions of your medical record. 10. Click the Download Summary menu link to download a portable copy of your medical information. If you have questions, please visit the Frequently Asked Questions section of the clickTRUE website. Remember, clickTRUE is NOT to be used for urgent needs. For medical emergencies, dial 911. Now available from your iPhone and Android! Introducing Barak Lawson As a [employer] patient, I wanted to make you aware of our electronic visit tool called Barak Lawson. [employer] 24/7 allows you to connect within minutes with a medical provider 24 hours a day, seven days a week via a mobile device or tablet or logging into a secure website from your computer. You can access TrueNorthLogic from anywhere in the [place]. A virtual visit might be right for you when you have a simple condition and feel like you just dont want to get out of bed, or cant get away from work for an appointment, when your regular [employer] provider is not available (evenings, weekends or holidays), or when youre out of town and need minor care. Electronic visits cost only $49 and if the Managed by Q 24/7 provider determines a prescription is needed to treat your condition, one can be electronically transmitted to a nearby pharmacy*. Please take a moment to enroll today if you have not already done so. The enrollment process is free and takes just a few minutes. To enroll, please download the Cherl Grain 24/7 jean to your tablet or phone, or visit [email or web address] to enroll on your computer. And, as an [address] [place] [address] patient with a [employer] account, the results of your visits will be scanned into your electronic medical record and your primary care provider will be able to view the scanned results. We urge you to continue to see your regular [facility] provider for your ongoing medical care. And while your primary care provider may not be the one available when you seek a Barak Sotolibertadfin virtual visit, the peace of mind you get from getting a real diagnosis real time can be priceless. For more information on Gamadorlibertadfin, view our Frequently Asked Questions (FAQs) at [email or web address]. Sincerely, 
 
Carmita Mitchell MD 
Chief Medical Officer [address] *:  certain medications cannot be prescribed via Barak Web and Ranknoman Providers Seen During Your Hospitalization Provider Specialty Primary office phone Linda Shah MD Internal Medicine [phone] Clyde Piedra MD Internal Medicine [phone] Your Primary Care Physician (PCP) Primary Care Physician Office Phone Office Fax Dos Gaj [phone] You are allergic to the following Allergen Reactions Bactrim (Sulfamethoprim) Nausea Only HEADACHE Oxycodone Nausea Only LIGHTHEADEDNESS Recent Documentation Weight Breastfeeding? BMI OB Status Smoking Status 92.1 kg No 35.96 kg/m2 Postmenopausal Never Smoker Emergency Contacts Name Discharge Info Relation Home Work Mobile Rita Robert DISCHARGE CAREGIVER [3] Sister [23] [phone] Nicky Loja DISCHARGE CAREGIVER [3] Sister [23] [phone] Patient Belongings The following personal items are in your possession at time of discharge: 
  Dental Appliances: Lowers, Partials, Uppers  Visual Aid: Glasses      Home Medications: None   Jewelry: None  Clothing: Dress, Footwear, Undergarments, With patient    Other Valuables: Cell Phone, Brea Teresa, With patient Please provide this summary of care documentation to your next provider. Signatures-by signing, you are acknowledging that this After Visit Summary has been reviewed with you and you have received a copy. Patient Signature:  ____________________________________________________________ Date:  ____________________________________________________________  
  
Aloma Snellen Provider Signature:  ____________________________________________________________ Date:  ____________________________________________________________

## 2018-09-02 NOTE — IP AVS SNAPSHOT
[address] [facility] [address] [place] [address] 
[phone] Patient: Estrella Canchola MRN: [id number] HPR:7/18/4250 A check lyudmlia indicates which time of day the medication should be taken. My Medications START taking these medications Instructions Each Dose to Equal  
 Morning Noon Evening Bedtime * apixaban 5 mg tablet Commonly known as:  Garyvester Slywmariam Your last dose was: Your next dose is: Take 10 mg (2 tabs by mouth) twice daily x 7 days and then drop to 5 mg (1 tab by mouth) twice a day. * apixaban 5 mg tablet Commonly known as:  Clevester Slywl Start taking on:  10/1/2018 Your last dose was: Your next dose is: Take 1 Tab by mouth two (2) times a day. 5 mg * Notice: This list has 2 medication(s) that are the same as other medications prescribed for you. Read the directions carefully, and ask your doctor or other care provider to review them with you. CONTINUE taking these medications Instructions Each Dose to Equal  
 Morning Noon Evening Bedtime  
 losartan 50 mg tablet Commonly known as:  COZAAR Your last dose was: Your next dose is: Take 50 mg by mouth daily. 50 mg  
    
   
   
   
  
 triamterene-hydroCHLOROthiazide 37.5-25 mg per tablet Commonly known as:  Santi Spear Your last dose was: Your next dose is: Take 1 Tab by mouth daily. 1 Tab STOP taking these medications   
 celecoxib 200 mg capsule Commonly known as:  CeleBREX  
   
  
 oxyCODONE IR 5 mg immediate release tablet Commonly known as:  ROXICODONE  
   
  
 rivaroxaban 10 mg tablet Commonly known as:  Veronika Castañeda Where to Get Your Medications These medications were sent to [facility], [address] [address] [address] Phone:  [phone]  
  apixaban 5 mg tablet Information on where to get these meds will be given to you by the nurse or doctor. ! Ask your nurse or doctor about these medications  
  apixaban 5 mg tablet

## 2018-09-03 VITALS
BODY MASS INDEX: 35.96 KG/M2 | OXYGEN SATURATION: 99 % | DIASTOLIC BLOOD PRESSURE: 65 MMHG | WEIGHT: 203 LBS | TEMPERATURE: 97.9 F | RESPIRATION RATE: 16 BRPM | SYSTOLIC BLOOD PRESSURE: 125 MMHG | HEART RATE: 75 BPM

## 2018-09-03 PROBLEM — I82.409 DEEP VEIN THROMBOSIS (DVT) OF LOWER EXTREMITY (HCC): Status: ACTIVE | Noted: 2018-09-03

## 2018-09-03 PROBLEM — O22.30 DVT (DEEP VEIN THROMBOSIS) IN PREGNANCY: Status: ACTIVE | Noted: 2018-09-03

## 2018-09-03 LAB
ANION GAP SERPL CALC-SCNC: 7 MMOL/L (ref 5–15)
BUN SERPL-MCNC: 18 MG/DL (ref 6–20)
BUN/CREAT SERPL: 20 (ref 12–20)
CALCIUM SERPL-MCNC: 8.8 MG/DL (ref 8.5–10.1)
CHLORIDE SERPL-SCNC: 106 MMOL/L (ref 97–108)
CO2 SERPL-SCNC: 28 MMOL/L (ref 21–32)
CREAT SERPL-MCNC: 0.88 MG/DL (ref 0.55–1.02)
GLUCOSE SERPL-MCNC: 108 MG/DL (ref 65–100)
POTASSIUM SERPL-SCNC: 3.6 MMOL/L (ref 3.5–5.1)
SODIUM SERPL-SCNC: 141 MMOL/L (ref 136–145)

## 2018-09-03 PROCEDURE — 99218 HC RM OBSERVATION: CPT

## 2018-09-03 PROCEDURE — 74011250636 HC RX REV CODE- 250/636: Performed by: HOSPITALIST

## 2018-09-03 PROCEDURE — 74011250637 HC RX REV CODE- 250/637: Performed by: HOSPITALIST

## 2018-09-03 PROCEDURE — 36415 COLL VENOUS BLD VENIPUNCTURE: CPT | Performed by: HOSPITALIST

## 2018-09-03 PROCEDURE — 96372 THER/PROPH/DIAG INJ SC/IM: CPT

## 2018-09-03 PROCEDURE — 80048 BASIC METABOLIC PNL TOTAL CA: CPT | Performed by: HOSPITALIST

## 2018-09-03 RX ORDER — LANOLIN ALCOHOL/MO/W.PET/CERES
1 CREAM (GRAM) TOPICAL 2 TIMES DAILY WITH MEALS
Status: DISCONTINUED | OUTPATIENT
Start: 2018-09-03 | End: 2018-09-03 | Stop reason: HOSPADM

## 2018-09-03 RX ORDER — ACETAMINOPHEN 325 MG/1
650 TABLET ORAL
Status: DISCONTINUED | OUTPATIENT
Start: 2018-09-03 | End: 2018-09-03 | Stop reason: HOSPADM

## 2018-09-03 RX ORDER — ONDANSETRON 2 MG/ML
4 INJECTION INTRAMUSCULAR; INTRAVENOUS
Status: DISCONTINUED | OUTPATIENT
Start: 2018-09-03 | End: 2018-09-03 | Stop reason: HOSPADM

## 2018-09-03 RX ORDER — LOSARTAN POTASSIUM 50 MG/1
50 TABLET ORAL DAILY
Status: DISCONTINUED | OUTPATIENT
Start: 2018-09-03 | End: 2018-09-03 | Stop reason: HOSPADM

## 2018-09-03 RX ORDER — ENOXAPARIN SODIUM 100 MG/ML
1 INJECTION SUBCUTANEOUS EVERY 12 HOURS
Status: DISCONTINUED | OUTPATIENT
Start: 2018-09-03 | End: 2018-09-03 | Stop reason: HOSPADM

## 2018-09-03 RX ORDER — POTASSIUM CHLORIDE 750 MG/1
40 TABLET, FILM COATED, EXTENDED RELEASE ORAL
Status: COMPLETED | OUTPATIENT
Start: 2018-09-03 | End: 2018-09-03

## 2018-09-03 RX ORDER — ENOXAPARIN SODIUM 100 MG/ML
1 INJECTION SUBCUTANEOUS EVERY 12 HOURS
Status: DISCONTINUED | OUTPATIENT
Start: 2018-09-03 | End: 2018-09-03 | Stop reason: DRUGHIGH

## 2018-09-03 RX ORDER — HYDROCODONE BITARTRATE AND ACETAMINOPHEN 5; 325 MG/1; MG/1
1 TABLET ORAL
Status: DISCONTINUED | OUTPATIENT
Start: 2018-09-03 | End: 2018-09-03 | Stop reason: HOSPADM

## 2018-09-03 RX ORDER — SODIUM CHLORIDE 0.9 % (FLUSH) 0.9 %
5-10 SYRINGE (ML) INJECTION AS NEEDED
Status: DISCONTINUED | OUTPATIENT
Start: 2018-09-03 | End: 2018-09-03 | Stop reason: HOSPADM

## 2018-09-03 RX ORDER — SODIUM CHLORIDE 0.9 % (FLUSH) 0.9 %
5-10 SYRINGE (ML) INJECTION EVERY 8 HOURS
Status: DISCONTINUED | OUTPATIENT
Start: 2018-09-03 | End: 2018-09-03 | Stop reason: HOSPADM

## 2018-09-03 RX ORDER — TRIAMTERENE/HYDROCHLOROTHIAZID 37.5-25 MG
1 TABLET ORAL DAILY
Status: DISCONTINUED | OUTPATIENT
Start: 2018-09-03 | End: 2018-09-03 | Stop reason: HOSPADM

## 2018-09-03 RX ADMIN — FERROUS SULFATE TAB 325 MG (65 MG ELEMENTAL FE) 325 MG: 325 (65 FE) TAB at 07:50

## 2018-09-03 RX ADMIN — LOSARTAN POTASSIUM 50 MG: 50 TABLET ORAL at 08:56

## 2018-09-03 RX ADMIN — Medication 10 ML: at 02:00

## 2018-09-03 RX ADMIN — ENOXAPARIN SODIUM 90 MG: 100 INJECTION SUBCUTANEOUS at 07:47

## 2018-09-03 RX ADMIN — POTASSIUM CHLORIDE 40 MEQ: 750 TABLET, EXTENDED RELEASE ORAL at 02:00

## 2018-09-03 RX ADMIN — TRIAMTERENE AND HYDROCHLOROTHIAZIDE 1 TABLET: 37.5; 25 TABLET ORAL at 08:57

## 2018-09-03 NOTE — H&P
HISTORY AND PHYSICAL 
 
 
PCP: Ronel Shearer MD 
History source: the patient, outside hospital records CC: blood clot HPI: 58 y.o lady w/ a recent R hip replacement placed on prophylactic dose Xarelto and celecoxib who presented to [facility] with left leg paresthesias yesterday. She has chronic intermittent paresthesias of that leg but symptoms persisted this time so she went to the ED there. Work-up ultimately revealed left femoral vein DVT. She was given sq Lovenox 1.5mg/kg and transferred here for hematology evaluation because she \"failed\" Xarelto therapy. The patient denies leg pain or swelling, she denies a personal or family history of blood clots, and she reports compliance with the Xarelto. She has also been taking the Celebrex. She hasn't been significantly immobile post-op as her pain has been controlled and she is moving around with a walker. She denies chest pain or shortness of breath. PMH/PSH: 
Past Medical History:  
Diagnosis Date  Anxiety  Arthritis  Hypertension BORDERLINE Past Surgical History:  
Procedure Laterality Date  HX COLONOSCOPY Home meds:  
Prior to Admission medications Medication Sig Start Date End Date Taking? Authorizing Provider  
oxyCODONE IR (ROXICODONE) 5 mg immediate release tablet Take 1 Tab by mouth every four (4) hours as needed for Pain. Max Daily Amount: 30 mg. 8/24/18   Haimda Ramos PA-C  
rivaroxaban (XARELTO) 10 mg tablet Take 1 Tab by mouth daily (with lunch) for 35 days. Indications: hip surgery deep vein thrombosis prevention 8/24/18 9/28/18  Hamida Ramos PA-C  
celecoxib (CELEBREX) 200 mg capsule Take 1 Cap by mouth daily for 30 days. 8/24/18 9/23/18  Hamida Ramos PA-C  
losartan (COZAAR) 50 mg tablet Take 50 mg by mouth daily. Historical Provider  
triamterene-hydroCHLOROthiazide (MAXZIDE) 37.5-25 mg per tablet Take 1 Tab by mouth daily. Historical Provider Allergies: Allergies Allergen Reactions  Bactrim [Sulfamethoprim] Nausea Only HEADACHE  
 Oxycodone Nausea Only LIGHTHEADEDNESS  
 
 
FH: 
Family History Problem Relation Age of Onset  Heart Disease Mother  Heart defect Mother Albino Reena  Diabetes Mother BORDERLINE   
 Heart Attack Father  Stroke Father  Heart Disease Sister AFIB  Anesth Problems Neg Hx SH: Social History Substance Use Topics  Smoking status: Never Smoker  Smokeless tobacco: Never Used  Alcohol use No  
 
 
ROS: A comprehensive review of systems was negative except for that written in the HPI. PHYSICAL EXAM: 
Visit Vitals  /87 (BP 1 Location: Right arm, BP Patient Position: Sitting)  Pulse 79  Temp 98.2 °F (36.8 °C)  Resp 16  SpO2 100% Gen: NAD, non-toxic HEENT: anicteric sclerae, normal conjunctiva, oropharynx clear, MM moist 
Neck: supple, trachea midline, no adenopathy Heart: RRR, no MRG, no JVD, no peripheral edema Lungs: CTA b/l, non-labored respirations Abd: soft, NT, ND, BS+, no organomegaly Extr: warm Skin: dry, no rash, R hip incision site c/d/i w/ staples in place Neuro: CN II-XII grossly intact, normal speech, moves all extremities Psych: normal mood, appropriate affect Labs/Imaging: No results found for this or any previous visit (from the past 24 hour(s)). No results for input(s): WBC, HGB, HCT, PLT, HGBEXT, HCTEXT, PLTEXT in the last 72 hours. No results for input(s): NA, K, CL, CO2, BUN, CREA, GLU, CA, MG, PHOS, URICA in the last 72 hours. No results for input(s): SGOT, GPT, ALT, AP, TBIL, TBILI, TP, ALB, GLOB, GGT, AML, LPSE in the last 72 hours. No lab exists for component: AMYP, HLPSE No results for input(s): CPK, CKNDX, TROIQ in the last 72 hours. No lab exists for component: CPKMB No results for input(s): INR, PTP, APTT in the last 72 hours.  
 
No lab exists for component: INREXT  
 
 No results for input(s): PH, PCO2, PO2 in the last 72 hours. Assessment & Plan: Acute L femoral vein DVT Acute L femoral vein DVT: seems like she developed a DVT despite taking Xarelto at prophylactic doses, I'm not sure I would consider this \"failure\" of the drug 
-continue therapeutic sq Lovenox 
-can likely be started on therapeutic dose PO Eliquis or warfarin, but will consult hematology since this was the reason for transfer and defer to them 
-stop Celebrex Anemia: microcytic at outside facility w/ hgb 11.2, this is improved from her last admission from 10 
-start empiric ferrous sulfate 
-needs outpatient f/u and colonoscopy if she hasn't had one Hypokalemia: K 3.4 at outside ER 
-replete DVT ppx: Lovenox sq Code status: full Disposition: home in AM 
 
Signed By: Mellissa Khan MD   
 September 3, 2018

## 2018-09-03 NOTE — PROGRESS NOTES
[facility] [place] [address] [address] W: [phone]  F: [phone] Reason for Visit:  
Felix Muniz is a 58 y.o. female who is seen in consultation at the request of Dr. Herson Khan for evaluation of DVT. Treatment History:  
xarelto prophylactic post hip History of Present Illness:  
 
Pt seen today in hospital consult for DVT on prophylactic xarelto post hip. Pt with recent R hip replacement placed on prophylactic dose Xarelto and celecoxib who presented to [facility] with left leg paresthesias yesterday. She has chronic intermittent paresthesias of that leg but symptoms persisted this time so she went to the ED there. Work-up ultimately revealed left femoral vein DVT. She was given sq Lovenox 1.5mg/kg and transferred here for hematology evaluation because she \"failed\" Xarelto therapy. The patient denies leg pain or swelling, she denies a personal or family history of blood clots, and she reports compliance with the Xarelto. She has also been taking the Celebrex. She hasn't been significantly immobile post-op as her pain has been controlled and she is moving around with a walker. She denies chest pain or shortness of breath. Apparently no hematology consults at [facility]. Pt sitting up in chair and feels fine. Past Medical History:  
Diagnosis Date  Anxiety  Arthritis  Hypertension BORDERLINE Past Surgical History:  
Procedure Laterality Date  HX COLONOSCOPY Social History Substance Use Topics  Smoking status: Never Smoker  Smokeless tobacco: Never Used  Alcohol use No  
  
Family History Problem Relation Age of Onset  Heart Disease Mother  Heart defect Mother Abel Root  Diabetes Mother BORDERLINE   
 Heart Attack Father  Stroke Father  Heart Disease Sister AFIB  Anesth Problems Neg Hx Current Facility-Administered Medications Medication Dose Route Frequency  sodium chloride (NS) flush 5-10 mL  5-10 mL IntraVENous Q8H  
 sodium chloride (NS) flush 5-10 mL  5-10 mL IntraVENous PRN  
 acetaminophen (TYLENOL) tablet 650 mg  650 mg Oral Q4H PRN  
 HYDROcodone-acetaminophen (NORCO) 5-325 mg per tablet 1 Tab  1 Tab Oral Q4H PRN  
 ondansetron (ZOFRAN) injection 4 mg  4 mg IntraVENous Q4H PRN  
 losartan (COZAAR) tablet 50 mg  50 mg Oral DAILY  triamterene-hydroCHLOROthiazide (MAXZIDE) 37.5-25 mg per tablet 1 Tab  1 Tab Oral DAILY  ferrous sulfate tablet 325 mg  1 Tab Oral BID WITH MEALS  enoxaparin (LOVENOX) injection 90 mg  1 mg/kg SubCUTAneous Q12H Allergies Allergen Reactions  Bactrim [Sulfamethoprim] Nausea Only HEADACHE  
 Oxycodone Nausea Only LIGHTHEADEDNESS Review of Systems: A complete review of systems was obtained, negative except as described above. Physical Exam:  
 
Visit Vitals  /65  Pulse 75  Temp 97.9 °F (36.6 °C)  Resp 16  Wt 203 lb (92.1 kg)  SpO2 99%  Breastfeeding No  
 BMI 35.96 kg/m2 ECOG PS: 1 General: No distress Eyes: anicteric sclerae HENT: Atraumatic Neck: Supple Respiratory: CTAB, normal respiratory effort CV: Normal rate, regular rhythm GI: Soft, nontender MS: can walk with a walker Skin: warm, dry Psych: Alert, oriented, appropriate affect, normal judgment/insight Results:  
 
Lab Results Component Value Date/Time WBC 8.1 08/16/2018 12:36 PM  
 HGB 10.0 (L) 08/24/2018 02:51 AM  
 HCT 41.8 08/16/2018 12:36 PM  
 PLATELET 152 47/84/8721 12:36 PM  
 MCV 80.4 08/16/2018 12:36 PM  
 
Lab Results Component Value Date/Time  Sodium 141 09/03/2018 05:01 AM  
 Potassium 3.6 09/03/2018 05:01 AM  
 Chloride 106 09/03/2018 05:01 AM  
 CO2 28 09/03/2018 05:01 AM  
 Glucose 108 (H) 09/03/2018 05:01 AM  
 BUN 18 09/03/2018 05:01 AM  
 Creatinine 0.88 09/03/2018 05:01 AM  
 GFR est AA >60 09/03/2018 05:01 AM  
 GFR est non-AA >60 09/03/2018 05:01 AM  
 Calcium 8.8 09/03/2018 05:01 AM  
 Glucose (POC) 107 (H) 08/23/2018 06:11 AM  
 
No results found for: TBILI, ALT, SGOT, AP, TP, ALB, GLOB Records reviewed and summarized above. Pathology report(s) reviewed above. Radiology report(s) reviewed above. Assessment:  
 
1)  Provoked reported DVT on subtherapeutic xarelto post hip surgery. Get ultrasound report. Pt transferred from [facility] as no hematology available at this hospital.  
Agree with hospitalist assessment. Fine for eliquis or coumadin therapeutic dosing. On lovenox now. Pt clinically stable. Would do 3 months anticoagulation for provoked DVT. Can f/u with PCP for this. 2) post hip surgery. Per ortho. 3) stable mild anemia. F/u with PCP. Call if questions We will not f/u in office unless requested by PCP as outpt. I appreciate the opportunity to participate in MsJong Taylor Daly nikko.  
 
Signed By: Nasim Valdes DO

## 2018-09-03 NOTE — DISCHARGE INSTRUCTIONS
Discharge Instructions     PATIENT ID: Ana M Gutierrez  MRN: [id number]   YOB: 1955    DATE OF ADMISSION: 9/2/2018 11:51 PM    DATE OF DISCHARGE: 9/3/2018  PRIMARY CARE PROVIDER: Billy Mahmood MD   ATTENDING PHYSICIAN: No Johnson MD  DISCHARGING PROVIDER: Violeta Jack NP. To contact this individual call [phone] and ask the  to page. If unavailable ask to be transferred the Adult Hospitalist Department. DISCHARGE DIAGNOSES:  DVT post hip surgery    CONSULTATIONS: IP CONSULT TO HEMATOLOGY    FOLLOW UP APPOINTMENTS:   Follow-up Information     Follow up With Details Comments Contact Info    Billy Mahmood MD In 1 month  [address] [facility] [address] [address], MD On 9/10/2018  [address]  [phone]           ADDITIONAL CARE RECOMMENDATIONS:  Deedee Sharma    Start Eliquis these evening, as directed. 10 mg (2 tabs) twice daily x 7 days and then start 5 mg (1 tab) twice daily. You will take this medication for ~ 3 months for a PROVOKED DVT (most likely due to hip surgery). Your PCP can manage this. Two prescriptions are provided for Eliquis (first was sent directly to [address] in [place]) and then second will be given as a paper copy. Take Tylenol for pain as needed. Consider starting daily iron - follow up with PCP regarding anemia monitoring/colonoscopy needs    DIET: Regular Diet  ACTIVITY: as per New En PT  WOUND CARE: Staples out per Ortho discretion/orders    EQUIPMENT needed: none new    · It is important that you take the medication exactly as they are prescribed. · Keep your medication in the bottles provided by the pharmacist and keep a list of the medication names, dosages, and times to be taken in your wallet. · Do not take other medications without consulting your doctor. NOTIFY YOUR PHYSICIAN FOR ANY OF THE FOLLOWING:   Fever over 101 degrees for 24 hours.    Chest pain, shortness of breath, fever, chills, nausea, vomiting, diarrhea, change in mentation, falling, weakness, bleeding. Severe pain or pain not relieved by medications. Or, any other signs or symptoms that you may have questions about.     DISPOSITION:    Home With:   OT  PT  HH  RN       SNF/Inpatient Rehab/LTAC    Independent/assisted living    Hospice   xx Other: Home, resume home care     CDMP Checked:   Yes *x*     PROBLEM LIST Updated:  Yes *x*     Signed:   Beverley Lainez NP  9/3/2018  11:44 AM

## 2018-09-03 NOTE — PROGRESS NOTES
Primary Nurse Renetta Barber RN and Alley Cohn RN performed a dual skin assessment on this patient No impairment noted Leonel score is 21 S/P previous right hip surgery. Right hip incision with stapled to air, well approximated.

## 2018-09-03 NOTE — DISCHARGE SUMMARY
Discharge Summary     PATIENT ID: Fuad Timmons  MRN: [id number]   YOB: 1955    DATE OF ADMISSION: 9/2/2018 11:51 PM    DATE OF DISCHARGE: 9/3/2018  PRIMARY CARE PROVIDER: Anne Villarreal MD   ATTENDING PHYSICIAN: Chikis Dumont MD  DISCHARGING PROVIDER: Eddi Marquez NP    To contact this individual call [phone] and ask the  to page. If unavailable ask to be transferred the Adult Hospitalist Department. CONSULTATIONS: IP CONSULT TO HEMATOLOGY    ADMITTING [address] [place] [address] COURSE:   Pt presented to [facility] with left leg paresthesias 9/2. She has chronic intermittent paresthesias of that leg but symptoms persisted this time so she went to the ED. Work-up ultimately revealed a L femoral vein DVT. She was given sq Lovenox 1.5mg/kg and transferred to [facility] for hematology evaluation because she \"failed\" Xarelto therapy. Denied leg pain or swelling, no personal or family history of blood clots, and reported compliance with the Xarelto. She has also been taking  Celebrex as prescribed. She hasn't been significantly immobile post-op as her pain has been controlled and she is moving around with a walker. Denied chest pain or shortness of breath. DISCHARGE DIAGNOSES / PLAN:      Acute L femoral vein DVT:   - developed a DVT despite taking/compliance with Xarelto at prophylactic dose  - appreciate Heme/Onc input and evaluation. - start therapeutic dose Eliquis this evening: will be taking 10 mg twice daily x 7 days and then 5 mg twice daily for total of 3 months.   - stop Celebrex  - can follow up with her PCP, does not need to have f/u with Heme/Onc     Anemia:   - microcytic at outside facility w/ hgb 11.2, this is improved from her last admit from 10  - follow up with PCP regarding anemia monitoring and colonoscopy needs  - suggested consideration of daily iron     Hypokalemia: resolved        FOLLOW UP APPOINTMENTS:    Follow-up Information     Follow up With Details Comments Contact Info    Siva Upton MD In 1 month  [address] [facility] [address]      Ron Khan MD On 9/10/2018  [address]  [phone]           ADDITIONAL CARE RECOMMENDATIONS:  Adelaida Weiss    Start Eliquis these evening, as directed. 10 mg (2 tabs) twice daily x 7 days and then start 5 mg (1 tab) twice daily. You will take this medication for ~ 3 months for a PROVOKED DVT (most likely due to hip surgery). Your PCP can manage this. Two prescriptions are provided for Eliquis (first was sent directly to [facility] DR RAFAEL YANG in [place]) and then second will be given as a paper copy. Take Tylenol for pain as needed. DIET: Regular Diet    ACTIVITY: as per New En PT    WOUND CARE: Staples out per Ortho discretion/orders    EQUIPMENT needed: none new    DISCHARGE MEDICATIONS:  Current Discharge Medication List      START taking these medications    Details   !! apixaban (ELIQUIS) 5 mg tablet Take 10 mg (2 tabs by mouth) twice daily x 7 days and then drop to 5 mg (1 tab by mouth) twice a day. Qty: 71 Tab, Refills: 0      !! apixaban (ELIQUIS) 5 mg tablet Take 1 Tab by mouth two (2) times a day. Qty: 60 Tab, Refills: 1       !! - Potential duplicate medications found. Please discuss with provider. CONTINUE these medications which have NOT CHANGED    Details   losartan (COZAAR) 50 mg tablet Take 50 mg by mouth daily. triamterene-hydroCHLOROthiazide (MAXZIDE) 37.5-25 mg per tablet Take 1 Tab by mouth daily. STOP taking these medications       oxyCODONE IR (ROXICODONE) 5 mg immediate release tablet Comments:   Reason for Stopping:         rivaroxaban (XARELTO) 10 mg tablet Comments:   Reason for Stopping:         celecoxib (CELEBREX) 200 mg capsule Comments:   Reason for Stopping:             NOTIFY YOUR PHYSICIAN FOR ANY OF THE FOLLOWING:   Fever over 101 degrees for 24 hours.    Chest pain, shortness of breath, fever, chills, nausea, vomiting, diarrhea, change in mentation, falling, weakness, bleeding. Severe pain or pain not relieved by medications. Or, any other signs or symptoms that you may have questions about. DISPOSITION:    Home With:   OT  PT  HH  RN       Long term SNF/Inpatient Rehab    Independent/assisted living    Hospice   xx Other: Home, resume HH     PATIENT CONDITION AT DISCHARGE:   Functional status    Poor     Deconditioned    xx Independent      Cognition   xx  Lucid     Forgetful     Dementia      Catheters/lines (plus indication)    Krishna     PICC     PEG    xx None      Code status   xx  Full code     DNR      PHYSICAL EXAMINATION AT DISCHARGE:  /65  Pulse 75  Temp 97.9 °F (36.6 °C)  Resp 16  Wt 92.1 kg (203 lb)  SpO2 99%  Breastfeeding? No  BMI 35.96 kg/m2    Pt sitting up in bed, pain free at this time. No new complaints this am.     Discussed change of blood-thinning medications to Eliquis and dosing instructions. Have called [facility] in [place] and they have Eliquis there (pts usual pharmacy is closed today). Co-pay is ~ $60.00 but we will be providing a free 30 day trial coupon and then for her subsequent prescriptions, we will be providing a $10.00 co pay card. Gen: well noursihed female in no acute distress  HEENT: mucous membranes moist  Lungs: no accessory muscle use, no SOB  Extremities/Skin: R leg with some thigh edema, swelling around incisional site. Incision is well approximated and no drainage noted from staples. Both legs in HILLARY hose. Neuro: A/O x 3.  ACOSTA.    CHRONIC MEDICAL DIAGNOSES:  Problem List as of 9/3/2018  Date Reviewed: 9/3/2018          Codes Class Noted - Resolved    Deep vein thrombosis (DVT) of lower extremity ([facility] [address] ICD-10-CM: I82.409  ICD-9-CM: 453.40  9/3/2018 - Present        Primary osteoarthritis of right hip ICD-10-CM: M16.11  ICD-9-CM: 715.15  8/17/2018 - Present            Greater than 30 minutes were spent with the patient on counseling and coordination of care    Signed:   Debbie Springer NP  9/3/2018  11:49 AM

## 2018-09-03 NOTE — PROGRESS NOTES
Bedside and Verbal shift change report given to Kaleb De Los Santos (oncoming nurse) by Felice Cruz RN (offgoing nurse). Report included the following information SBAR and ED Summary.

## 2018-09-03 NOTE — ROUTINE PROCESS
I have reviewed discharge instructions with the patient. The patient verbalized understanding. Prescriptions were given to pt and verbalized understanding. All questions the pt had were answered. Pt was discharged via wheelchair with [facility].

## 2018-09-03 NOTE — PROGRESS NOTES
TRANSFER - IN REPORT: 
 
Verbal report received from [facility] RN(name) on [employer]  being received from [facility] ER(unit) for routine progression of care Report consisted of patients Situation, Background, Assessment and  
Recommendations(SBAR). Information from the following report(s) SBAR, MAR and Recent Results was reviewed with the receiving nurse Sam Garcia RN. Opportunity for questions and clarification was provided. Assessment completed upon patients arrival to unit and care assumed.

## 2019-09-30 ENCOUNTER — TELEPHONE (OUTPATIENT)
Dept: CARDIOLOGY CLINIC | Age: 64
End: 2019-09-30

## 2019-09-30 ENCOUNTER — OFFICE VISIT (OUTPATIENT)
Dept: CARDIOLOGY CLINIC | Age: 64
End: 2019-09-30

## 2019-09-30 VITALS
HEIGHT: 63 IN | BODY MASS INDEX: 35.97 KG/M2 | OXYGEN SATURATION: 96 % | DIASTOLIC BLOOD PRESSURE: 82 MMHG | WEIGHT: 203 LBS | SYSTOLIC BLOOD PRESSURE: 140 MMHG | HEART RATE: 98 BPM

## 2019-09-30 DIAGNOSIS — I82.509 CHRONIC DEEP VEIN THROMBOSIS (DVT) OF LOWER EXTREMITY, UNSPECIFIED LATERALITY, UNSPECIFIED VEIN (HCC): Primary | ICD-10-CM

## 2019-09-30 DIAGNOSIS — I10 ESSENTIAL HYPERTENSION: ICD-10-CM

## 2019-09-30 DIAGNOSIS — E66.01 SEVERE OBESITY (HCC): ICD-10-CM

## 2019-09-30 NOTE — PROGRESS NOTES
Visit Vitals  /82 (BP 1 Location: Left arm, BP Patient Position: Sitting)   Pulse 98   Ht 5' 3\" (1.6 m)   Wt 203 lb (92.1 kg)   SpO2 96%   BMI 35.96 kg/m²

## 2019-09-30 NOTE — PROGRESS NOTES
[facility] [address], [address] [facility], [address] [place] [address]    Office [phone],YOLIE [phone]           Adore Castorena is a 71 y.o. female  Presents for evaluation of VTE. Consult requested by  ADENIKE Granda.  CHAPITO Leo      Assessment/Recommendations:    ICD-10-CM ICD-9-CM    1. Chronic deep vein thrombosis (DVT) of lower extremity, unspecified laterality, unspecified vein (HCC) I82.509 453.50 AMB POC EKG ROUTINE W/ 12 LEADS, INTER & REP   2. Severe obesity ([facility] [address] E66.01 278.01    3. Essential hypertension I10 401.9        DVT 9/2018 was provoked DVT in the setting of hip replacement. She has been on Eliquis therapy for one year. The duplex findings likely represent chronic scarring related to prior DVT, rather than ongoing acute thrombus formation. She does not have high risk features, such as cancer nor prior DVT. She does not have clinical signs to indicate that she has ongoing thrombosis. Therefore, I would recommend to discontinue DOAC therapy at this time. Primary Care Physician- Suzan Tao MD:  ADENIKE Granda    Follow-up as needed    Subjective:  71 y.o. With a hx of hypertension, obesity presents for evaluation of chronic thromboembolic disease. She underwent right hip replacement last August.  Shortly after surgery she noticed worsening intermittent paresthesias of her left leg, but symptoms persisted this time so she went to the ED there. Work-up ultimately revealed left femoral vein DVT. She was transferred from [facility] to [facility] for hematology evaluation. She was on dvt ppx dose Xarelto at time of finding. She was ultimately changed to Eliquis 10mg [id number], then 5mg bid for 3 months. Duration of treatment was intended to be 3 months. She reports that subsequent duplex continued to show DVT, therefor eliquis was continued.   Her last [address] was 9/3/2019 which showed:  \"Normal phasic flow, augmentation and compression of the common femoral, popliteal and upper calf veins. Decreased flow with incomplete compression is noted in the mid-distal femoral vein, comparable to prior study. Impression: DVT in the mid-distal femoral vein, comparable to prior study. \"  She is without any swelling, she continues to have mild numbness within both hands and feet. She is without any shortness of breath, chest pain, RUBI. Past Medical History:   Diagnosis Date    Anxiety     Arthritis     Hypertension     BORDERLINE        Past Surgical History:   Procedure Laterality Date    HX COLONOSCOPY      HX HIP REPLACEMENT Right 08/2019         Current Outpatient Medications:     apixaban (ELIQUIS) 5 mg tablet, Take 10 mg (2 tabs by mouth) twice daily x 7 days and then drop to 5 mg (1 tab by mouth) twice a day., Disp: 71 Tab, Rfl: 0    apixaban (ELIQUIS) 5 mg tablet, Take 1 Tab by mouth two (2) times a day., Disp: 60 Tab, Rfl: 1    losartan (COZAAR) 50 mg tablet, Take 50 mg by mouth daily. , Disp: , Rfl:     triamterene-hydroCHLOROthiazide (MAXZIDE) 37.5-25 mg per tablet, Take 1 Tab by mouth daily. , Disp: , Rfl:     Allergies   Allergen Reactions    Bactrim [Sulfamethoprim] Nausea Only     HEADACHE    Oxycodone Nausea Only     LIGHTHEADEDNESS        Family History   Problem Relation Age of Onset    Heart Disease Mother     Heart defect Mother         [place] Pulse Diabetes Mother         BORDERLINE     Heart Attack Father     Stroke Father     Heart Disease Sister         AFIB    Anesth Problems Neg Hx        Social History     Tobacco Use    Smoking status: Never Smoker    Smokeless tobacco: Never Used   Substance Use Topics    Alcohol use: No    Drug use: No       Review of Symptoms:  Pertinent Positive: negative  Pertinent Negative: No chest pain, dyspnea on exertion, shortness of breath, orthopnea, PND    All Other systems reviewed and are negative for a Comprehensive ROS (10+)    Physical Exam    Blood pressure 140/82, pulse 98, height 5' 3\" (1.6 m), weight 203 lb (92.1 kg), SpO2 96 %. Constitutional:  well-developed and well-nourished. No distress. HENT: Normocephalic. Eyes: No scleral icterus. Neck:  Neck supple. No JVD present. Pulmonary/Chest: Effort normal and breath sounds normal. No respiratory distress, wheezes or rales. Cardiovascular: Normal rate, regular rhythm, S1 S2 . Exam reveals no gallop and no friction rub. No murmur heard. No edema. Extremities:  Normal muscle tone, equal calf sizes bilaterally. Abdominal:   No abnormal distension. Neurological:  Moving all extremities, cranial nerves appear grossly intact. Skin: Skin is not cold. Not diaphoretic. No erythema. Psychiatric:  Grossly normal mood and affect. Intact insight.     Objective Data:    ECG: personally reviewed and  intrepreted  9/30/2019              Alex Urbina DO

## 2019-09-30 NOTE — PROGRESS NOTES
Geremias Padilla is a 71 y.o. female    Chief Complaint   Patient presents with    New Patient    Other     DVT       Chest pain No    SOB No    Dizziness No    Swelling some in both ankles and numbness. Refills No    Visit Vitals  /82 (BP 1 Location: Left arm, BP Patient Position: Sitting)   Pulse 98   Ht 5' 3\" (1.6 m)   Wt 203 lb (92.1 kg)   SpO2 96%   BMI 35.96 kg/m²       1. Have you been to the ER, urgent care clinic since your last visit? Hospitalized since your last visit? No    2. Have you seen or consulted any other health care providers outside of the [address] [place] [address] since your last visit? Include any pap smears or colon screening.   No

## 2019-10-01 NOTE — TELEPHONE ENCOUNTER
Called patient advised per Dr Willow Inman he recommend her to discontinue taking the Eliquis at this time. Patient verbalized understanding.

## 2024-11-17 NOTE — PROGRESS NOTES
Problem: Mobility Impaired (Adult and Pediatric)  Goal: *Acute Goals and Plan of Care (Insert Text)  Physical Therapy Goals  Initiated 8/23/2018    1. Patient will move from supine to sit and sit to supine , scoot up and down and roll side to side in bed with modified independence within 4 days. 2. Patient will perform sit to stand with modified independence within 4 days. 3. Patient will ambulate with modified independence for 150 feet with the least restrictive device within 4 days. 4. Patient will ascend/descend 8 stairs with 2 handrail(s) with modified independence within 4 days. 5. Patient will perform THR home exercise program per protocol with modified independence within 4 days. physical Therapy TREATMENT  Patient: Sorin Pool (61 y.o. female)  Date: 8/25/2018  Diagnosis: DJD RIGHT HIP   Primary osteoarthritis of right hip Primary osteoarthritis of right hip  Procedure(s) (LRB):  RIGHT TOTAL HIP REPLACEMENT  (Right) 2 Days Post-Op  Precautions: Fall, WBAT  Chart, physical therapy assessment, plan of care and goals were reviewed. ASSESSMENT: Patient able to move supine to sit with min assist for RLE. She was able to stand with CGA and ambulate using a walker with supervision. Good carry over from yesterday with step through gait. Patient able to negotiate 8 steps using 1 rail with cane in opposite hand with supervision. Patient safe to return home with family assist as needed and HHPT. Progression toward goals:  [x]      Improving appropriately and progressing toward goals  []      Improving slowly and progressing toward goals  []      Not making progress toward goals and plan of care will be adjusted     PLAN:  Patient continues to benefit from skilled intervention to address the above impairments. Continue treatment per established plan of care.   Discharge Recommendations:  Home Health  Further Equipment Recommendations for Discharge: none     SUBJECTIVE:   Patient stated I am Wound care provider will be reaching out to you for an appointment.     Change dressing daily as follows:  Wash hands with soap and water.  Remove old dressing.  Moisten gauze and place on/into open wound bed.  Place dry gauze dressing over moistened gauze.  Tape down gauze.   ready.    OBJECTIVE DATA SUMMARY:   Critical Behavior:  Neurologic State: Alert, Appropriate for age  Orientation Level: Oriented X4  Cognition: Appropriate for age attention/concentration, Follows commands  Safety/Judgement: Good awareness of safety precautions  Functional Mobility Training:  Bed Mobility:   Supine to Sit: Minimum assistance (assist with RLE)   Transfers:  Sit to Stand: Contact guard assistance  Stand to Sit: Contact guard assistance         Balance:  Sitting: Intact; Without support  Standing: Intact; With support  Ambulation/Gait Training:  Distance (ft): 200 Feet (ft)  Assistive Device: Gait belt;Walker, rolling  Ambulation - Level of Assistance: Supervision   Gait Abnormalities: Decreased step clearance  Right Side Weight Bearing: As tolerated   Stance: Right decreased  Speed/Amy: Slow  Step Length: Right shortened      Stairs:  Number of Stairs Trained: 8  Stairs - Level of Assistance: Supervision  Rail Use: Right  (cane in opposite hand)    Therapeutic Exercises:   SUPINE  EXERCISES   Sets   Reps   Active Active Assist   Passive Self ROM   Comments   Ankle Pumps 1 10 [x]                                        []                                        []                                        []                                           Quad Sets   []                                        []                                        []                                        []                                           Heel Slides   []                                        []                                        []                                        []                                           Hip Abduction   []                                        []                                        []                                        []                                           Glut Sets   []                                        []                                        [] []                                              []                                        []                                        []                                        []                                              []                                        []                                        []                                        []                                             STANDING  EXERCISES   Sets   Reps   Active Active Assist   Passive Self ROM   Comments   Heel Raises 1 10 [x]                                        []                                        []                                        []                                           Hip Abduction 1 10 [x]                                        []                                        []                                        []                                           Knee Flexion 1 10 [x]                                        []                                        []                                        []                                              []                                        []                                        []                                        []                                             Pain:  Pain Scale 1: Numeric (0 - 10)  Pain Intensity 1: 2      Activity Tolerance:     Please refer to the flowsheet for vital signs taken during this treatment.   After treatment:   [x] Patient left in no apparent distress sitting up in chair  [] Patient left in no apparent distress in bed  [x] Call bell left within reach  [x] Nursing notified  [] Caregiver present  [] Bed alarm activated    COMMUNICATION/COLLABORATION:   The patients plan of care was discussed with: Registered Nurse    Jessica Mendez PT   Time Calculation: 23 mins

## (undated) DEVICE — TOWEL SURG W17XL27IN STD BLU COT NONFENESTRATED PREWASHED

## (undated) DEVICE — Device

## (undated) DEVICE — SOLUTION IRRIG 3000ML 0.9% SOD CHL FLX CONT 0797208] ICU MEDICAL INC]

## (undated) DEVICE — SCRUB DRY SURG EZ SCRUB BRUSH PREOPERATIVE GRN

## (undated) DEVICE — SYR LR LCK 1ML GRAD NSAF 30ML --

## (undated) DEVICE — SUTURE STRATAFIX SYMMETRIC PDS + SZ 1 L18IN ABSRB VLT L48MM SXPP1A400

## (undated) DEVICE — SUTURE VCRL SZ 2-0 L36IN ABSRB UD L40MM CT 1/2 CIR J957H

## (undated) DEVICE — STERILE POLYISOPRENE POWDER-FREE SURGICAL GLOVES: Brand: PROTEXIS

## (undated) DEVICE — SLIM BODY SKIN STAPLER: Brand: APPOSE ULC

## (undated) DEVICE — 3M™ IOBAN™ 2 ANTIMICROBIAL INCISE DRAPE 6651EZ: Brand: IOBAN™ 2

## (undated) DEVICE — STERILE POLYISOPRENE POWDER-FREE SURGICAL GLOVES WITH EMOLLIENT COATING: Brand: PROTEXIS

## (undated) DEVICE — CONTAINER,SPECIMEN,3OZ,OR STRL: Brand: MEDLINE

## (undated) DEVICE — HANDLE LT SNAP ON ULT DURABLE LENS FOR TRUMPF ALC DISPOSABLE

## (undated) DEVICE — PATIENT PROTECTIVE PAD FOR IMP UNIVERSAL LATERAL HIP POSITIONER (ULP) (6/CASE): Brand: PATIENT PROTECTIVE PAD

## (undated) DEVICE — PAD 05IN BASE 3IN PEAK M DENS CONVOLUTED FOAM

## (undated) DEVICE — BLADE SAW W073XL276IN THK0031IN CUT THK0036IN REPL SAG

## (undated) DEVICE — DEVON™ KNEE AND BODY STRAP 60" X 3" (1.5 M X 7.6 CM): Brand: DEVON

## (undated) DEVICE — DRAPE,REIN 53X77,STERILE: Brand: MEDLINE

## (undated) DEVICE — TRAY CATH 16F URIN MTR LTX -- CONVERT TO ITEM 363111

## (undated) DEVICE — NEEDLE HYPO 22GA L1.5IN BLK S STL HUB POLYPR SHLD REG BVL

## (undated) DEVICE — COVER,MAYO STAND,STERILE: Brand: MEDLINE

## (undated) DEVICE — T4 HOOD

## (undated) DEVICE — GAUZE SPONGES,8 PLY: Brand: CURITY

## (undated) DEVICE — INFECTION CONTROL KIT SYS

## (undated) DEVICE — (D)PREP SKN CHLRAPRP APPL 26ML -- CONVERT TO ITEM 371833

## (undated) DEVICE — REM POLYHESIVE ADULT PATIENT RETURN ELECTRODE: Brand: VALLEYLAB

## (undated) DEVICE — HANDPIECE SET WITH BONE CLEANING TIP AND SUCTION TUBE: Brand: INTERPULSE

## (undated) DEVICE — 4-PORT MANIFOLD: Brand: NEPTUNE 2

## (undated) DEVICE — KENDALL SCD EXPRESS SLEEVES, KNEE LENGTH, MEDIUM: Brand: KENDALL SCD

## (undated) DEVICE — SUTURE VCRL SZ 1 L27IN ABSRB VLT L36MM CT-1 1/2 CIR J341H

## (undated) DEVICE — DRSG AQUACEL SURG 3.5 X 10IN -- CONVERT TO ITEM 370183

## (undated) DEVICE — BLADE ELECTRODE: Brand: EDGE

## (undated) DEVICE — SOLUTION IRRIG 1000ML H2O STRL BLT